# Patient Record
Sex: FEMALE | NOT HISPANIC OR LATINO | URBAN - METROPOLITAN AREA
[De-identification: names, ages, dates, MRNs, and addresses within clinical notes are randomized per-mention and may not be internally consistent; named-entity substitution may affect disease eponyms.]

---

## 2018-12-21 ENCOUNTER — EMERGENCY (EMERGENCY)
Facility: HOSPITAL | Age: 63
LOS: 1 days | Discharge: HOME | End: 2018-12-21
Attending: EMERGENCY MEDICINE
Payer: SELF-PAY

## 2018-12-21 VITALS
TEMPERATURE: 98 F | DIASTOLIC BLOOD PRESSURE: 93 MMHG | HEART RATE: 91 BPM | RESPIRATION RATE: 16 BRPM | OXYGEN SATURATION: 95 % | SYSTOLIC BLOOD PRESSURE: 155 MMHG

## 2018-12-21 PROBLEM — Z00.00 ENCOUNTER FOR PREVENTIVE HEALTH EXAMINATION: Status: ACTIVE | Noted: 2018-12-21

## 2018-12-21 PROCEDURE — 71046 X-RAY EXAM CHEST 2 VIEWS: CPT

## 2018-12-21 PROCEDURE — 90471 IMMUNIZATION ADMIN: CPT

## 2018-12-21 PROCEDURE — 71100 X-RAY EXAM RIBS UNI 2 VIEWS: CPT

## 2018-12-21 PROCEDURE — 99283 EMERGENCY DEPT VISIT LOW MDM: CPT | Mod: 25

## 2018-12-21 PROCEDURE — 71046 X-RAY EXAM CHEST 2 VIEWS: CPT | Mod: 26

## 2018-12-21 PROCEDURE — 99284 EMERGENCY DEPT VISIT MOD MDM: CPT

## 2018-12-21 PROCEDURE — 71100 X-RAY EXAM RIBS UNI 2 VIEWS: CPT | Mod: 26

## 2018-12-21 PROCEDURE — 90715 TDAP VACCINE 7 YRS/> IM: CPT

## 2018-12-21 RX ORDER — ACETAMINOPHEN 500 MG
325 TABLET ORAL ONCE
Qty: 0 | Refills: 0 | Status: COMPLETED | OUTPATIENT
Start: 2018-12-21 | End: 2018-12-21

## 2018-12-21 RX ORDER — ACETAMINOPHEN 500 MG
975 TABLET ORAL ONCE
Qty: 0 | Refills: 0 | Status: DISCONTINUED | OUTPATIENT
Start: 2018-12-21 | End: 2018-12-21

## 2018-12-21 RX ORDER — TETANUS TOXOID, REDUCED DIPHTHERIA TOXOID AND ACELLULAR PERTUSSIS VACCINE, ADSORBED 5; 2.5; 8; 8; 2.5 [IU]/.5ML; [IU]/.5ML; UG/.5ML; UG/.5ML; UG/.5ML
0.5 SUSPENSION INTRAMUSCULAR ONCE
Qty: 0 | Refills: 0 | Status: COMPLETED | OUTPATIENT
Start: 2018-12-21 | End: 2018-12-21

## 2018-12-21 RX ORDER — LIDOCAINE 4 G/100G
1 CREAM TOPICAL ONCE
Qty: 0 | Refills: 0 | Status: COMPLETED | OUTPATIENT
Start: 2018-12-21 | End: 2018-12-21

## 2018-12-21 RX ADMIN — LIDOCAINE 1 PATCH: 4 CREAM TOPICAL at 15:28

## 2018-12-21 RX ADMIN — Medication 325 MILLIGRAM(S): at 15:29

## 2018-12-21 RX ADMIN — TETANUS TOXOID, REDUCED DIPHTHERIA TOXOID AND ACELLULAR PERTUSSIS VACCINE, ADSORBED 0.5 MILLILITER(S): 5; 2.5; 8; 8; 2.5 SUSPENSION INTRAMUSCULAR at 15:29

## 2018-12-21 NOTE — ED ADULT NURSE NOTE - NSIMPLEMENTINTERV_GEN_ALL_ED
Implemented All Fall Risk Interventions:  [place] to call system. Call bell, personal items and telephone within reach. Instruct patient to call for assistance. Room bathroom lighting operational. Non-slip footwear when patient is off stretcher. Physically safe environment: no spills, clutter or unnecessary equipment. Stretcher in lowest position, wheels locked, appropriate side rails in place. Provide visual cue, wrist band, yellow gown, etc. Monitor gait and stability. Monitor for mental status changes and reorient to person, place, and time. Review medications for side effects contributing to fall risk. Reinforce activity limits and safety measures with patient and family.

## 2018-12-21 NOTE — ED ADULT NURSE NOTE - OBJECTIVE STATEMENT
63 year old female presents to ED via EMS complaining of slip and fall on water in basement at home falling to right side. complaining of pain to left back and arm. unsure if she hit her head however denies LOC. was able to stand up to ambulate to call for help. Patient is awake, alert, a&ox3, following commands, strong equal strength bilaterally x 4, sensory in tact, ambulating independently with steady gait noted.

## 2018-12-21 NOTE — ED PROVIDER NOTE - MUSCULOSKELETAL MINIMAL EXAM
mild left paraspinal mid thoracic to lumbar tenderness, no gross deformity, no swelling, no bruising, no stepoffs

## 2018-12-21 NOTE — ED PROVIDER NOTE - CROS ED GI ALL NEG
Your patient was seen today for a HRA visit. Abnormalities have been identified during this visit that may require additional testing and  follow up. I have included a copy of my visit note, please review the note and feel free to contact me with any questions.  Thank you for allowing me to participate in the care of your patients.  Beronica Walters NP  negative...

## 2018-12-21 NOTE — ED PROVIDER NOTE - MEDICAL DECISION MAKING DETAILS
63F presenting s/p mechanical fall. Mild msk tenderness and small abrasion. No focal deficits. Given mechanism and exam, will obs with no CT at this time.

## 2018-12-21 NOTE — ED PROVIDER NOTE - NSFOLLOWUPINSTRUCTIONS_ED_ALL_ED_FT
Take tylenol 600mg every 6 hours as needed for pain  Return to ED for any worsening or severe symptoms

## 2018-12-21 NOTE — ED PROVIDER NOTE - OBJECTIVE STATEMENT
63F with no pmh presenting with slip and fall at home. States she slipped on slippery wet floor and landed on her left side and might have hit her head. No LOC. Felt like the wind was knocked out of her initially. 63F with no pmh presenting with slip and fall at home with chief complaint of left sided back pain and mild headache. States she slipped on slippery wet floor and landed on her left side and might have hit her head. No LOC. Felt like the wind was knocked out of her initially. Was able to stand and ambulate after a few minutes. No fever, chills, changes in vision, cp, sob, vomiting, dysuria. Tetanus not up to date.

## 2018-12-21 NOTE — ED PROVIDER NOTE - PROGRESS NOTE DETAILS
Patient with improved symptoms. XR wet read negative. Patient with improved symptoms. XR wet read negative. Repeat neuro exam unchanged, benign

## 2019-06-03 NOTE — ED PROVIDER NOTE - TOBACCO USE
Problem: Communication  Goal: The ability to communicate needs accurately and effectively will improve  Outcome: PROGRESSING AS EXPECTED      Problem: Safety  Goal: Will remain free from falls  Outcome: PROGRESSING AS EXPECTED      Problem: Pain Management  Goal: Pain level will decrease to patient's comfort goal  Outcome: PROGRESSING AS EXPECTED      Problem: Psychosocial Needs:  Goal: Level of anxiety will decrease  Outcome: PROGRESSING AS EXPECTED         Unknown if ever smoked

## 2020-03-29 ENCOUNTER — INPATIENT (INPATIENT)
Facility: HOSPITAL | Age: 65
LOS: 5 days | Discharge: HOME | DRG: 177 | End: 2020-04-04
Attending: INTERNAL MEDICINE | Admitting: HOSPITALIST
Payer: COMMERCIAL

## 2020-03-29 VITALS
OXYGEN SATURATION: 94 % | TEMPERATURE: 101 F | SYSTOLIC BLOOD PRESSURE: 109 MMHG | HEART RATE: 94 BPM | DIASTOLIC BLOOD PRESSURE: 74 MMHG | RESPIRATION RATE: 16 BRPM

## 2020-03-29 DIAGNOSIS — R09.02 HYPOXEMIA: ICD-10-CM

## 2020-03-29 DIAGNOSIS — B34.2 CORONAVIRUS INFECTION, UNSPECIFIED: ICD-10-CM

## 2020-03-29 DIAGNOSIS — Z02.9 ENCOUNTER FOR ADMINISTRATIVE EXAMINATIONS, UNSPECIFIED: ICD-10-CM

## 2020-03-29 DIAGNOSIS — D72.825 BANDEMIA: ICD-10-CM

## 2020-03-29 DIAGNOSIS — B34.9 VIRAL INFECTION, UNSPECIFIED: ICD-10-CM

## 2020-03-29 DIAGNOSIS — Z29.9 ENCOUNTER FOR PROPHYLACTIC MEASURES, UNSPECIFIED: ICD-10-CM

## 2020-03-29 LAB
ALBUMIN SERPL ELPH-MCNC: 4 G/DL — SIGNIFICANT CHANGE UP (ref 3.3–5)
ALP SERPL-CCNC: 90 U/L — SIGNIFICANT CHANGE UP (ref 40–120)
ALT FLD-CCNC: 35 U/L — SIGNIFICANT CHANGE UP (ref 10–45)
ANION GAP SERPL CALC-SCNC: 16 MMOL/L — SIGNIFICANT CHANGE UP (ref 5–17)
APTT BLD: 32.9 SEC — SIGNIFICANT CHANGE UP (ref 27.5–36.3)
AST SERPL-CCNC: 25 U/L — SIGNIFICANT CHANGE UP (ref 10–40)
BASOPHILS # BLD AUTO: 0 K/UL — SIGNIFICANT CHANGE UP (ref 0–0.2)
BASOPHILS NFR BLD AUTO: 0 % — SIGNIFICANT CHANGE UP (ref 0–2)
BILIRUB SERPL-MCNC: 0.7 MG/DL — SIGNIFICANT CHANGE UP (ref 0.2–1.2)
BUN SERPL-MCNC: 7 MG/DL — SIGNIFICANT CHANGE UP (ref 7–23)
CALCIUM SERPL-MCNC: 9.2 MG/DL — SIGNIFICANT CHANGE UP (ref 8.4–10.5)
CHLORIDE SERPL-SCNC: 96 MMOL/L — SIGNIFICANT CHANGE UP (ref 96–108)
CO2 SERPL-SCNC: 25 MMOL/L — SIGNIFICANT CHANGE UP (ref 22–31)
CREAT SERPL-MCNC: 0.5 MG/DL — SIGNIFICANT CHANGE UP (ref 0.5–1.3)
EOSINOPHIL # BLD AUTO: 0 K/UL — SIGNIFICANT CHANGE UP (ref 0–0.5)
EOSINOPHIL NFR BLD AUTO: 0 % — SIGNIFICANT CHANGE UP (ref 0–6)
GIANT PLATELETS BLD QL SMEAR: PRESENT — SIGNIFICANT CHANGE UP
GLUCOSE SERPL-MCNC: 110 MG/DL — HIGH (ref 70–99)
HCT VFR BLD CALC: 39.5 % — SIGNIFICANT CHANGE UP (ref 34.5–45)
HGB BLD-MCNC: 13.3 G/DL — SIGNIFICANT CHANGE UP (ref 11.5–15.5)
INR BLD: 1.2 RATIO — HIGH (ref 0.88–1.16)
LYMPHOCYTES # BLD AUTO: 1.68 K/UL — SIGNIFICANT CHANGE UP (ref 1–3.3)
LYMPHOCYTES # BLD AUTO: 16.5 % — SIGNIFICANT CHANGE UP (ref 13–44)
MANUAL SMEAR VERIFICATION: SIGNIFICANT CHANGE UP
MCHC RBC-ENTMCNC: 30.2 PG — SIGNIFICANT CHANGE UP (ref 27–34)
MCHC RBC-ENTMCNC: 33.7 GM/DL — SIGNIFICANT CHANGE UP (ref 32–36)
MCV RBC AUTO: 89.6 FL — SIGNIFICANT CHANGE UP (ref 80–100)
MONOCYTES # BLD AUTO: 0.09 K/UL — SIGNIFICANT CHANGE UP (ref 0–0.9)
MONOCYTES NFR BLD AUTO: 0.9 % — LOW (ref 2–14)
NEUTROPHILS # BLD AUTO: 8.42 K/UL — HIGH (ref 1.8–7.4)
NEUTROPHILS NFR BLD AUTO: 69.6 % — SIGNIFICANT CHANGE UP (ref 43–77)
NEUTS BAND # BLD: 13 % — HIGH (ref 0–8)
PLAT MORPH BLD: NORMAL — SIGNIFICANT CHANGE UP
PLATELET # BLD AUTO: 222 K/UL — SIGNIFICANT CHANGE UP (ref 150–400)
POTASSIUM SERPL-MCNC: 3.5 MMOL/L — SIGNIFICANT CHANGE UP (ref 3.5–5.3)
POTASSIUM SERPL-SCNC: 3.5 MMOL/L — SIGNIFICANT CHANGE UP (ref 3.5–5.3)
PROCALCITONIN SERPL-MCNC: 0.14 NG/ML — HIGH (ref 0.02–0.1)
PROT SERPL-MCNC: 7.4 G/DL — SIGNIFICANT CHANGE UP (ref 6–8.3)
PROTHROM AB SERPL-ACNC: 13.7 SEC — HIGH (ref 10–12.9)
RBC # BLD: 4.41 M/UL — SIGNIFICANT CHANGE UP (ref 3.8–5.2)
RBC # FLD: 11.6 % — SIGNIFICANT CHANGE UP (ref 10.3–14.5)
RBC BLD AUTO: NORMAL — SIGNIFICANT CHANGE UP
SODIUM SERPL-SCNC: 137 MMOL/L — SIGNIFICANT CHANGE UP (ref 135–145)
TROPONIN T, HIGH SENSITIVITY RESULT: <6 NG/L — SIGNIFICANT CHANGE UP (ref 0–51)
WBC # BLD: 10.19 K/UL — SIGNIFICANT CHANGE UP (ref 3.8–10.5)
WBC # FLD AUTO: 10.19 K/UL — SIGNIFICANT CHANGE UP (ref 3.8–10.5)

## 2020-03-29 PROCEDURE — 71045 X-RAY EXAM CHEST 1 VIEW: CPT | Mod: 26

## 2020-03-29 PROCEDURE — 93010 ELECTROCARDIOGRAM REPORT: CPT

## 2020-03-29 PROCEDURE — 99285 EMERGENCY DEPT VISIT HI MDM: CPT

## 2020-03-29 PROCEDURE — 99233 SBSQ HOSP IP/OBS HIGH 50: CPT

## 2020-03-29 RX ORDER — ENOXAPARIN SODIUM 100 MG/ML
40 INJECTION SUBCUTANEOUS DAILY
Refills: 0 | Status: DISCONTINUED | OUTPATIENT
Start: 2020-03-29 | End: 2020-04-04

## 2020-03-29 RX ORDER — ALBUTEROL 90 UG/1
2 AEROSOL, METERED ORAL EVERY 6 HOURS
Refills: 0 | Status: DISCONTINUED | OUTPATIENT
Start: 2020-03-29 | End: 2020-04-04

## 2020-03-29 RX ORDER — ACETAMINOPHEN 500 MG
650 TABLET ORAL EVERY 4 HOURS
Refills: 0 | Status: DISCONTINUED | OUTPATIENT
Start: 2020-03-29 | End: 2020-04-04

## 2020-03-29 RX ORDER — ACETAMINOPHEN 500 MG
650 TABLET ORAL ONCE
Refills: 0 | Status: COMPLETED | OUTPATIENT
Start: 2020-03-29 | End: 2020-03-29

## 2020-03-29 RX ADMIN — Medication 650 MILLIGRAM(S): at 13:07

## 2020-03-29 NOTE — ED PROVIDER NOTE - CLINICAL SUMMARY MEDICAL DECISION MAKING FREE TEXT BOX
64y F sent in from urgent care for hypoxia in setting of fever, cough, chest pain, shortness of breath, diarrhea. on exam patient is well appearing, vitals wnl, lungs with bibasilar crackles, rest of exam unremarkable. Concern for viral illness, possible COVID, pneumonia, unlikely cardiac however will obtain cxr, ekg, labs, trop, likely dc home with covid precautions.

## 2020-03-29 NOTE — ED PROVIDER NOTE - OBJECTIVE STATEMENT
AV: 64y F otherwise healthy sent in by urgent care for fever x 12 days, cough x 3 days, chest pain and shortness of breath. Patient complains of fever x 12 days responsive to tylenol and ibuprofen, intermittent chest pain with coughing, and worsening shortness of breath. Was seen at urgent care, found to have sat 93%, sent to emergency department for further evaluation. Patient has been taking tylenol, ibuprofen, and albuterol inhaler. Endorses few episodes diarrhea.

## 2020-03-29 NOTE — H&P ADULT - ASSESSMENT
Pt is a 63 yo F w/ no medical history, presenting for 12 days of worsening SOB, fevers, and coughs. CXR concerning for multifocal PNA, ?exposure to possible COVID, COVID PCR pending

## 2020-03-29 NOTE — ED PROVIDER NOTE - MUSCULOSKELETAL, MLM
Telephone Encounter by Yumiko Mc RN at 11/14/17 11:57 AM     Author:  Yumiko Mc RN Service:  (none) Author Type:  Registered Nurse     Filed:  11/14/17 11:57 AM Encounter Date:  11/13/2017 Status:  Signed     :  Yumiko Mc RN (Registered Nurse)       From: Uri Dietrich  To: Tong Cole DO  Sent: 11/13/2017  4:28 PM CST  Subject: Upcoming Appointment    Lets use the 11/17 at 1pm at [facility] if still open       Revision History        Date/Time User Provider Type Action    > 11/14/17 11:57 AM Yumiko Mc RN Registered Nurse Sign    Attribution information within the note text is not available.            
Spine appears normal.

## 2020-03-29 NOTE — ED PROVIDER NOTE - NSFOLLOWUPINSTRUCTIONS_ED_ALL_ED_FT
You were evaluated in the Emergency Department for fever and cough.  You were examined by a physician and had labs, a chest xray and an ekg.  You likely have a viral respiratory infection, and which coul be caused by the novel coronavirus (COVID-19).    At this time, you do not meet our hospital's criteria for coronavirus testing and we are unable to test you specifically for the COVID-19 virus that is causing many infections around the world; however, we still recommend that you stay home and self-quarantine (avoid contact with other people) until 3 days after your symptoms have resolved.      See the CDC website for more information on this virus and what to do when sick: https://www.cdc.gov/coronavirus/2019-ncov/if-you-are-sick/steps-when-sick.html    We recommend that you:  1. Continue your home medications as prescribed.  2. Take Tylenol, 2 extra strength tablets, up to every 6 hours as needed for pain or any fever.  3. Drink plenty of fluids.  4. Call your primary care doctor tomorrow for follow-up.  5. Avoid contact with people and self-quarantine at home.    *** Return immediately (or call 911) if you have increased difficulty breathing, vomiting, chest pain, or develop other new/concerning symptoms. ***    YOU WERE SEEN IN THE ED FOR A LIKELY VIRAL ILLNESS. WE CANNOT PERFORM DEFINITIVE TESTING AT THIS TIME FOR THE NOVEL CORONAVIRUS.    YOU SHOULD SELF-QUARANTINE FOR 14 DAYS TO AVOID POTENTIAL SPREAD OF THIS VIRUS.    For outpatient coronavirus testing centers you may contact the following resources:  -[place] Novel Coronavirus 24/7 Hotline: (375)-[phone] (can contact for some [facility] testing centers, including the [facility] testing center).  -[facility]. To locate you local center: [facility]  -[facility]: [phone]    What is a coronavirus?  Coronaviruses are a large family of viruses that cause illnesses ranging from the common cold to more severe diseases such as Middle East Respiratory Syndrome (MERS) and Severe Acute Respiratory Syndrome (SARS).    What is Novel Coronavirus (COVID-19)?  The Centers for Disease Control and Prevention (CDC) is closely monitoring the outbreak caused by COVID-19. For the latest information about COVID-19, visit the CDC website at CDC.gov/Coronavirus    How are coronaviruses spread?  Coronaviruses can be transmitted from person to person, usually after close contact with an infected  person (for example, in a household, workplace, or healthcare setting), via droplets that become airborne after a cough or sneeze. These droplets can then infect a nearby person. Transmission can also occur by touching recently contaminated surfaces.    Is there a treatment for a COVID-19?  There is no specific treatment for disease caused by COVID-19. However, many of the symptoms can be treated based on the patient’s clinical condition. Supportive care for infected persons can be highly effective.    What are the symptoms of coronavirus infection?  It depends on the virus, but common signs include fever and/or respiratory symptoms such as cough and shortness of breath. In more severe cases, infection can cause pneumonia, severe acute respiratory syndrome, kidney failure and even death. Fortunately, most cases of COVID-19 have an illness no different than the influenza (flu), with a majority of these patients having mild symptoms and overall mortality which appears to be not much different than the flu.    What can I do to protect myself?  The best precautionary measures:  – washing your hands  – covering your cough  – disinfecting surfaces  – it is also advisable to avoid close contact with anyone showing symptoms of respiratory illness such as coughing and sneezing  – those with symptoms should wear a surgical mask when around others    What can I do to protect those around me?  If you have been identified as someone who may be infected with COVID-19, we recommend you follow the self-isolation procedures outlined on the following page to protect those around you and to limit the spread of this virus.    We recommend the below precautionary steps from now until 14 days from when you returned from your travel or date of your last known possible contact:    — Do not go to work, school or public areas. Avoid using public transportation, ridesharing or taxis.  — As much as possible, separate yourself from other people in your home. If you can, you should stay in a room and away from other people. Also, you should use a separate bathroom if available.  — Wear the supplied mask whenever you are around other people.  — If you have a non-urgent medical appointment, please reschedule for a later date. If the appointment is urgent, please call the health care provider and tell them that you are on self-isolation for possible COVID-19. This will help the health care provider’s office take steps to keep other people from getting infected or exposed. If you can reschedule routine appointments, do so.  — Wash your hands often with soap and water for at least 15 to 20 seconds or clean your hands with an alcohol-based hand  that contains 60 to 95% alcohol, covering all surfaces of your hands and rubbing them together until they feel dry. Soap and water should be used preferentially if hands are visibly dirty.  — Cover your mouth and nose with a tissue when you cough or sneeze. Throw used tissues in a lined trash can. Immediately wash your hands.  — Avoid touching your eyes, nose, and mouth with your hands.  — Avoid sharing personal household items. You should not share dishes, drinking glasses, cups, eating utensils, towels, or bedding with other people or pets in your home. After using these items, they should be washed thoroughly with soap and water.  — Clean and disinfect all “high-touch” surfaces every day. High touch surfaces include counters, tabletops, doorknobs, light switches, remote controls, bathroom fixtures, toilets, phones, keyboards, tablets, and bedside tables. Also, clean any surfaces that may have blood, stool, or body fluids on them.

## 2020-03-29 NOTE — ED PROVIDER NOTE - CARE PLAN
Principal Discharge DX:	Viral illness Principal Discharge DX:	Viral illness  Secondary Diagnosis:	Suspected 2019 novel coronavirus infection  Secondary Diagnosis:	Hypoxia

## 2020-03-29 NOTE — H&P ADULT - NSHPREVIEWOFSYSTEMS_GEN_ALL_CORE
REVIEW OF SYSTEMS:    CONSTITUTIONAL: fevers, dizziness  EYES/ENT: No visual changes;  No vertigo or throat pain   NECK: No pain or stiffness  RESPIRATORY: +cough, wheezing, hemoptysis; SOB  CARDIOVASCULAR: No chest pain or palpitations  GASTROINTESTINAL: No abdominal or epigastric pain. +nausea, vomiting, no hematemesis; resolved diarrhea , no constipation. No melena or hematochezia.  GENITOURINARY: No dysuria, frequency or hematuria  NEUROLOGICAL: No numbness or weakness  SKIN: No itching, rashes

## 2020-03-29 NOTE — H&P ADULT - HISTORY OF PRESENT ILLNESS
Pt is a 65 yo F w/ no medical history, presenting for 12 days of worsening SOB, fevers, and coughs. Spoke to patient's daughter in Greenlandic over the phone, due to pt not picking up her cellphone. As per pt's daughter, pt symptoms started 12 days ago, first showing as fevers, then later during this week developed coughs, and dizziness. Pt tried taking mucinex and tylenol for two days for her symptoms but stopped taking these medications after she developed some nausea and vomiting. Pt also experienced intermittent episodes of diarrhea, which has resolved. Pt's daughter reports pt works at a [employer] on [facility], where she believes she was exposed to a patient having cold-like symptoms 2 weeks prior to her onset of symptoms, unclear if that pt is confirmed COVID infection. This morning pt started to have increased WOB and presented to a local urgent care where she was found to have O2 sat of 93% and was recommended to go to a larger hospital center for further testing. Pt otherwise denies chills, chest p/p, sob, skin changes, no recent travelling.     At the ED pt vitals sig for Temp 100.5 /74, HR 94, O2 sat 94% on RA, RR 16. CBC sig for 13% bands, no lymphopenia, CMP wnl, CMP wnl, CXR sig for Diffuse bilateral patchy opacities throughout the lung fields, COVID PCR pending Pt is a 65 yo F w/ no medical history, presenting for 12 days of worsening SOB, fevers, and coughs. Spoke to patient's daughter in French over the phone, due to pt not picking up her cellphone. As per pt's daughter, pt symptoms started 12 days ago, first showing as fevers, then later during this week developed coughs, and dizziness. Pt tried taking mucinex and tylenol for two days for her symptoms but stopped taking these medications after she developed some nausea and vomiting. Pt also experienced intermittent episodes of diarrhea, which has resolved. Pt's daughter reports pt works at a [employer] on [facility], where she believes she was exposed to a patient having cold-like symptoms 2 weeks prior to her onset of symptoms, unclear if that pt is confirmed COVID infection. This morning pt started to have increased WOB and presented to a local urgent care where she was found to have O2 sat of 93% and was recommended to go to a larger hospital center for further testing. Pt otherwise denies chills, chest p/p, sob, skin changes, no recent travelling.     At the ED pt vitals sig for Temp 100.5 /74, HR 94, O2 sat 94% on RA, RR 16. CBC sig for 13% bands, no lymphopenia, CMP wnl, CXR sig for Diffuse bilateral patchy opacities throughout the lung fields, COVID PCR pending

## 2020-03-29 NOTE — ED ADULT NURSE REASSESSMENT NOTE - NS ED NURSE REASSESS COMMENT FT1
Pt. was planned to be disharged, when doing VS pt O2 dropped to 89% on room air, placed pt on 2L and O2 improved (see flowsheet). MD currently in room, now plans to admit pt. NAD.

## 2020-03-29 NOTE — ED ADULT TRIAGE NOTE - ARRIVAL FROM
Problem: Ineffective Coping  Goal: Participates in unit activities  Interventions:  - Provide therapeutic environment   - Provide required programming   - Redirect inappropriate behaviors    Outcome: Progressing Doctor's office

## 2020-03-29 NOTE — H&P ADULT - NSHPSOCIALHISTORY_GEN_ALL_CORE
Lives at home with sibling and , denies smoking, drinking recreational drug use, works at a [employer]. Family has been self-isolating themselves.

## 2020-03-29 NOTE — ED ADULT NURSE NOTE - OBJECTIVE STATEMENT
Pt is a 65 y/o female c/o sob, dry cough, cp worsening when laying flat, fever improved w tylenol/ibuprofen x12 days. Went to PCP today who told her to come to ED to rule out pneumonia/covid. Denies PMH/PSH. States she had a few episodes of diarrhea in past 12 days. Denies N/V, numbness, tingling, dizziness, weakness, headache. A&Ox3. Breathing unlabored and spontaneous. Abdomen soft, nontender, nondistended. Full ROM and strength of extremities. Skin dry and intact. Safety and comfort measures provided.

## 2020-03-29 NOTE — ED ADULT NURSE NOTE - NSIMPLEMENTINTERV_GEN_ALL_ED
Patent Implemented All Universal Safety Interventions:  [place] to call system. Call bell, personal items and telephone within reach. Instruct patient to call for assistance. Room bathroom lighting operational. Non-slip footwear when patient is off stretcher. Physically safe environment: no spills, clutter or unnecessary equipment. Stretcher in lowest position, wheels locked, appropriate side rails in place.

## 2020-03-29 NOTE — H&P ADULT - PROBLEM SELECTOR PLAN 5
Problem: Discharge planning issues. Plan; Transitions of Care Status:  1. Name of PCP: Dr. Thomas  2. PCP contacted on Admission: []Y [X]N  3. PCP contacted at Discharge: []Y []N  4. Post-Discharge Appointment Date and Location:   5. Summary of Handoff given to PCP:

## 2020-03-29 NOTE — ED PROVIDER NOTE - PROGRESS NOTE DETAILS
AV: labs reviewed. CXR concerning for COVID. Patient satting well on RA. Will dc home with instructions to isolate. AV: on reassessment prior to discharge, patient now satting 88-89%on RA, improved with NC 2L. Also received call from lab, 15% bands. Will admit to hospital for oxygen, COVID swab sent.

## 2020-03-29 NOTE — H&P ADULT - NSHPLABSRESULTS_GEN_ALL_CORE
LABS: Personally reviewed labs, imaging, and ECG                          13.3   10.19 )-----------( 222      ( 29 Mar 2020 12:53 )             39.5       03-29    137  |  96  |  7   ----------------------------<  110<H>  3.5   |  25  |  0.50    Ca    9.2      29 Mar 2020 12:53    TPro  7.4  /  Alb  4.0  /  TBili  0.7  /  DBili  x   /  AST  25  /  ALT  35  /  AlkPhos  90  03-29       LIVER FUNCTIONS - ( 29 Mar 2020 12:53 )  Alb: 4.0 g/dL / Pro: 7.4 g/dL / ALK PHOS: 90 U/L / ALT: 35 U/L / AST: 25 U/L / GGT: x           PT/INR - ( 29 Mar 2020 12:53 )   PT: 13.7 sec;   INR: 1.20 ratio         PTT - ( 29 Mar 2020 12:53 )  PTT:32.9 sec    Lactate Trend    CAPILLARY BLOOD GLUCOSE    RADIOLOGY & ADDITIONAL TESTS:    < from: Xray Chest 1 View- PORTABLE-Urgent (03.29.20 @ 13:18) >    IMPRESSION:    Diffuse bilateral patchy opacities throughout the lung fields, compatible with multifocal pneumonia.    < end of copied text >

## 2020-03-29 NOTE — H&P ADULT - ATTENDING COMMENTS
64F w/ no medical history, presenting with c/o worsening SOB, fevers, and cough x 12 days. CXR concerning for multifocal PNA, ?exposure to possible COVID. O2 sats 89% on room air. Check Procalcitonin, bld cx, urine Legionella. f/up COVID PCR result ; if positive start Plaquenil. Check EKG for QTc monitoring. O2 supplementation via NC ; monitor O2 saturation. Airborne/Contact Isolation precautions. Lovenox for DVT ppx.

## 2020-03-29 NOTE — H&P ADULT - PROBLEM/PLAN-4
ASSESSMENT COMPLETED AS CHARTED. VSS. PT OFF OF BEDREST AT 2000. NO DZZINESS
NOTED, STEADY GAIT. PT DENIES PAIN, WEAKNESS, N/V/D. A/V PACED ON MONITOR. PT
DENIES ANY FIRTHER NEEDS AT THIS TIME. HOURLY ROUNDING IN PLACE FOR PT SAFETY.
CLWR. DISPLAY PLAN FREE TEXT

## 2020-03-29 NOTE — ED PROVIDER NOTE - PATIENT PORTAL LINK FT
You can access the FollowMyHealth Patient Portal offered by [facility] by registering at the following website: [email or web address]. By joining [facility]’s FollowMyHealth portal, you will also be able to view your health information using other applications (apps) compatible with our system.

## 2020-03-29 NOTE — H&P ADULT - PROBLEM SELECTOR PLAN 1
Possible COVID19 exposure ~3-4 weeks prior, found to have declining O2 sat, CXR showing multifocal PNA  - f/u COVID19 test  - Possible COVID19 exposure ~3-4 weeks prior, found to have declining O2 sat, CXR showing multifocal PNA  - f/u COVID19 PCR  - will add hydroxycholoroquine 400mg BID firs day, 200mg BID next 4days  - f/u QTc

## 2020-03-29 NOTE — H&P ADULT - PROBLEM SELECTOR PLAN 2
Found to have elevated bands on initial CBC, w/o leukocytosis  - In the setting of fevers, will get Bcx  - Found to have elevated bands on initial CBC, w/o leukocytosis  - In the setting of fevers, will get Bcx x2  - If COVID neg, will start  abx for CAP  - AVOID peneciliins, pt develops SOB symptoms Found to have elevated bands on initial CBC, w/o leukocytosis  - In the setting of fevers, will get Bcx x2  - If COVID neg or procal elevated, will start  abx for CAP  - AVOID penicillins, pt develops SOB symptoms Found to have elevated bands on initial CBC, w/o leukocytosis  - In the setting of fevers, will get Bcx x2  - If COVID neg or procal elevated, will start  abx for CAP  - f/u urine legionella  - AVOID penicillins, pt develops SOB symptoms

## 2020-03-29 NOTE — H&P ADULT - PROBLEM SELECTOR PLAN 3
Found to have O2 sat to 93% on RA outpatient, 94% RA at the ED  - titrate up O2 requirements as per COVID protocol  - if on nonrebreather, will consider adding salumedrol at 1mg/kg BID dosing, for 3-5 days as per [facility] pulm/icu guidelines Found to have O2 sat to 93% on RA outpatient, 94% RA at the ED, now on 2L NC  - titrate up O2 requirements as per COVID protocol  - if on nonrebreather, will consider adding salumedrol at 1mg/kg BID dosing, for 3-5 days as per [facility] pulm/icu guidelines

## 2020-03-29 NOTE — H&P ADULT - NSHPPHYSICALEXAM_GEN_ALL_CORE
Vital Signs (24 Hrs):  T(C): 37.2 (03-29-20 @ 16:15), Max: 38.1 (03-29-20 @ 10:29)  HR: 80 (03-29-20 @ 16:15) (80 - 94)  BP: 106/78 (03-29-20 @ 16:15) (106/78 - 130/82)  RR: 18 (03-29-20 @ 16:24) (16 - 18)  SpO2: 95% (03-29-20 @ 16:24) (89% - 95%)  Wt(kg): --  PHYSICAL EXAM: Taken from ED physical exam to preserve PPE and exposure to further COVID19  GENERAL: NAD, lying in bed comfortably  HEAD:  Atraumatic, Normocephalic  EYES: EOMI, PERRLA, conjunctiva and sclera clear  ENT: Moist mucous membranes  NECK: Supple, No JVD  CHEST/LUNG: Rales in left lower lobe  HEART: Regular rate and rhythm; No murmurs, rubs, or gallops  ABDOMEN: Bowel sounds present; Soft, Nontender, Nondistended   EXTREMITIES:  2+ Peripheral Pulses, brisk capillary refill. No clubbing, cyanosis, or edema  NERVOUS SYSTEM:  Alert & Oriented X3, speech clear. No deficits   MSK: FROM all 4 extremities, full and equal strength  SKIN: No rashes or lesions

## 2020-03-30 LAB
ALBUMIN SERPL ELPH-MCNC: 3.6 G/DL — SIGNIFICANT CHANGE UP (ref 3.3–5)
ALP SERPL-CCNC: 89 U/L — SIGNIFICANT CHANGE UP (ref 40–120)
ALT FLD-CCNC: 29 U/L — SIGNIFICANT CHANGE UP (ref 10–45)
ANION GAP SERPL CALC-SCNC: 16 MMOL/L — SIGNIFICANT CHANGE UP (ref 5–17)
AST SERPL-CCNC: 19 U/L — SIGNIFICANT CHANGE UP (ref 10–40)
BASOPHILS # BLD AUTO: 0.01 K/UL — SIGNIFICANT CHANGE UP (ref 0–0.2)
BASOPHILS NFR BLD AUTO: 0.1 % — SIGNIFICANT CHANGE UP (ref 0–2)
BILIRUB SERPL-MCNC: 0.6 MG/DL — SIGNIFICANT CHANGE UP (ref 0.2–1.2)
BUN SERPL-MCNC: 11 MG/DL — SIGNIFICANT CHANGE UP (ref 7–23)
CALCIUM SERPL-MCNC: 9 MG/DL — SIGNIFICANT CHANGE UP (ref 8.4–10.5)
CHLORIDE SERPL-SCNC: 92 MMOL/L — LOW (ref 96–108)
CO2 SERPL-SCNC: 24 MMOL/L — SIGNIFICANT CHANGE UP (ref 22–31)
CREAT SERPL-MCNC: 0.5 MG/DL — SIGNIFICANT CHANGE UP (ref 0.5–1.3)
EOSINOPHIL # BLD AUTO: 0.01 K/UL — SIGNIFICANT CHANGE UP (ref 0–0.5)
EOSINOPHIL NFR BLD AUTO: 0.1 % — SIGNIFICANT CHANGE UP (ref 0–6)
GLUCOSE SERPL-MCNC: 165 MG/DL — HIGH (ref 70–99)
HCT VFR BLD CALC: 38.3 % — SIGNIFICANT CHANGE UP (ref 34.5–45)
HCV AB S/CO SERPL IA: 0.1 S/CO — SIGNIFICANT CHANGE UP (ref 0–0.99)
HCV AB SERPL-IMP: SIGNIFICANT CHANGE UP
HGB BLD-MCNC: 13 G/DL — SIGNIFICANT CHANGE UP (ref 11.5–15.5)
IMM GRANULOCYTES NFR BLD AUTO: 0.5 % — SIGNIFICANT CHANGE UP (ref 0–1.5)
LYMPHOCYTES # BLD AUTO: 1.25 K/UL — SIGNIFICANT CHANGE UP (ref 1–3.3)
LYMPHOCYTES # BLD AUTO: 12.7 % — LOW (ref 13–44)
MCHC RBC-ENTMCNC: 31 PG — SIGNIFICANT CHANGE UP (ref 27–34)
MCHC RBC-ENTMCNC: 33.9 GM/DL — SIGNIFICANT CHANGE UP (ref 32–36)
MCV RBC AUTO: 91.4 FL — SIGNIFICANT CHANGE UP (ref 80–100)
MONOCYTES # BLD AUTO: 0.39 K/UL — SIGNIFICANT CHANGE UP (ref 0–0.9)
MONOCYTES NFR BLD AUTO: 4 % — SIGNIFICANT CHANGE UP (ref 2–14)
NEUTROPHILS # BLD AUTO: 8.14 K/UL — HIGH (ref 1.8–7.4)
NEUTROPHILS NFR BLD AUTO: 82.6 % — HIGH (ref 43–77)
NRBC # BLD: 0 /100 WBCS — SIGNIFICANT CHANGE UP (ref 0–0)
PLATELET # BLD AUTO: 244 K/UL — SIGNIFICANT CHANGE UP (ref 150–400)
POTASSIUM SERPL-MCNC: 3.1 MMOL/L — LOW (ref 3.5–5.3)
POTASSIUM SERPL-SCNC: 3.1 MMOL/L — LOW (ref 3.5–5.3)
PROT SERPL-MCNC: 7.4 G/DL — SIGNIFICANT CHANGE UP (ref 6–8.3)
RBC # BLD: 4.19 M/UL — SIGNIFICANT CHANGE UP (ref 3.8–5.2)
RBC # FLD: 11.8 % — SIGNIFICANT CHANGE UP (ref 10.3–14.5)
SARS-COV-2 RNA SPEC QL NAA+PROBE: DETECTED
SODIUM SERPL-SCNC: 132 MMOL/L — LOW (ref 135–145)
WBC # BLD: 9.85 K/UL — SIGNIFICANT CHANGE UP (ref 3.8–10.5)
WBC # FLD AUTO: 9.85 K/UL — SIGNIFICANT CHANGE UP (ref 3.8–10.5)

## 2020-03-30 PROCEDURE — 99232 SBSQ HOSP IP/OBS MODERATE 35: CPT

## 2020-03-30 PROCEDURE — 93010 ELECTROCARDIOGRAM REPORT: CPT

## 2020-03-30 RX ORDER — INFLUENZA VIRUS VACCINE 15; 15; 15; 15 UG/.5ML; UG/.5ML; UG/.5ML; UG/.5ML
0.5 SUSPENSION INTRAMUSCULAR ONCE
Refills: 0 | Status: DISCONTINUED | OUTPATIENT
Start: 2020-03-30 | End: 2020-04-04

## 2020-03-30 RX ORDER — POTASSIUM CHLORIDE 20 MEQ
40 PACKET (EA) ORAL ONCE
Refills: 0 | Status: COMPLETED | OUTPATIENT
Start: 2020-03-30 | End: 2020-03-30

## 2020-03-30 RX ADMIN — Medication 650 MILLIGRAM(S): at 05:26

## 2020-03-30 RX ADMIN — Medication 40 MILLIEQUIVALENT(S): at 17:58

## 2020-03-30 RX ADMIN — ENOXAPARIN SODIUM 40 MILLIGRAM(S): 100 INJECTION SUBCUTANEOUS at 12:37

## 2020-03-30 RX ADMIN — Medication 650 MILLIGRAM(S): at 04:29

## 2020-03-30 NOTE — PROGRESS NOTE ADULT - PROBLEM SELECTOR PLAN 1
Possible COVID19 exposure ~3-4 weeks prior, found to have declining O2 sat, CXR showing multifocal PNA  - COVID positive  - will add hydroxycholoroquine 400mg BID firs day, 200mg BID next 4days  - Qtc 437, continue to monitor Possible COVID19 exposure ~3-4 weeks prior, found to have declining O2 sat, CXR showing multifocal PNA  - COVID positive  - will hold off starting plaquenil, satting well on 2L currently - if respiratory status worsens or oxygen requirements increase, consider starting plaquenil  - Qtc 437, continue to monitor

## 2020-03-30 NOTE — PROGRESS NOTE ADULT - PROBLEM SELECTOR PLAN 2
Found to have elevated bands on initial CBC, w/o leukocytosis  - In the setting of fevers, will get Bcx x2  - f/u urine legionella  - AVOID penicillins, pt develops SOB symptoms

## 2020-03-30 NOTE — PROGRESS NOTE ADULT - ASSESSMENT
Pt is a 65 yo F w/ no medical history, presenting for 12 days of worsening SOB, fevers, and coughs. CXR concerning for multifocal PNA, ?exposure to possible COVID, COVID PCR pending

## 2020-03-30 NOTE — PROGRESS NOTE ADULT - PROBLEM SELECTOR PLAN 3
Found to have O2 sat to 93% on RA outpatient, 94% RA at the ED, now on 2L NC  - titrate up O2 requirements as per COVID protocol

## 2020-03-30 NOTE — PROGRESS NOTE ADULT - SUBJECTIVE AND OBJECTIVE BOX
[facility] Division of Hospital Medicine Progress Note    Patient is a 64y old  Female who presents with a chief complaint of SOB (29 Mar 2020 17:55)      SUBJECTIVE / OVERNIGHT EVENTS:  Patient seen and examined - spoke with patient post-exam via Frisian  [id number]. She is feeling slightly better, still having mild cough and SOB.  ADDITIONAL REVIEW OF SYSTEMS:  Denies current abdominal symptoms    MEDICATIONS  (STANDING):  enoxaparin Injectable 40 milliGRAM(s) SubCutaneous daily  influenza   Vaccine 0.5 milliLiter(s) IntraMuscular once  potassium chloride    Tablet ER 40 milliEquivalent(s) Oral once    MEDICATIONS  (PRN):  acetaminophen   Tablet .. 650 milliGRAM(s) Oral every 4 hours PRN Temp greater or equal to 38.5C (101.3F)  acetaminophen  Suppository .. 650 milliGRAM(s) Rectal every 4 hours PRN Temp greater or equal to 38.5C (101.3F)  ALBUTerol    90 MICROgram(s) HFA Inhaler 2 Puff(s) Inhalation every 6 hours PRN cough      CAPILLARY BLOOD GLUCOSE        I&O's Summary      PHYSICAL EXAM:  Vital Signs Last 24 Hrs  T(C): 36.9 (30 Mar 2020 12:35), Max: 38.3 (30 Mar 2020 04:26)  T(F): 98.4 (30 Mar 2020 12:35), Max: 100.9 (30 Mar 2020 04:26)  HR: 69 (30 Mar 2020 12:35) (69 - 89)  BP: 103/66 (30 Mar 2020 12:35) (103/66 - 148/81)  BP(mean): --  RR: 20 (30 Mar 2020 12:35) (18 - 20)  SpO2: 98% (30 Mar 2020 12:35) (89% - 98%)    CONSTITUTIONAL: NAD, well-developed, well-groomed  ENMT: Moist oral mucosa, no pharyngeal injection or exudates; normal dentition  RESPIRATORY: Normal respiratory effort; lungs are clear to auscultation bilaterally  CARDIOVASCULAR: Regular rate and rhythm, normal S1 and S2, no murmur/rub/gallop; No lower extremity edema; Peripheral pulses are 2+ bilaterally  ABDOMEN: Nontender to palpation, normoactive bowel sounds, no rebound/guarding; No hepatosplenomegaly  PSYCH: A+O to person, place, and time; affect appropriate  NEUROLOGY: CN 2-12 are intact and symmetric; no gross sensory deficits   SKIN: No rashes; no palpable lesions    LABS:                        13.0   9.85  )-----------( 244      ( 30 Mar 2020 11:45 )             38.3     03-30    132<L>  |  92<L>  |  11  ----------------------------<  165<H>  3.1<L>   |  24  |  0.50    Ca    9.0      30 Mar 2020 11:45    TPro  7.4  /  Alb  3.6  /  TBili  0.6  /  DBili  x   /  AST  19  /  ALT  29  /  AlkPhos  89  03-30    PT/INR - ( 29 Mar 2020 12:53 )   PT: 13.7 sec;   INR: 1.20 ratio         PTT - ( 29 Mar 2020 12:53 )  PTT:32.9 sec          COVID-19 PCR: Detected (03-29-20 @ 16:39)      RADIOLOGY & ADDITIONAL TESTS:  Imaging from Last 24 Hours:  Electrocardiogram/QTc Interval: Qtc 437    COORDINATION OF CARE:  Care Discussed with Consultants/Other Providers:

## 2020-03-31 LAB
ALBUMIN SERPL ELPH-MCNC: 3.6 G/DL — SIGNIFICANT CHANGE UP (ref 3.3–5)
ALP SERPL-CCNC: 85 U/L — SIGNIFICANT CHANGE UP (ref 40–120)
ALT FLD-CCNC: 26 U/L — SIGNIFICANT CHANGE UP (ref 10–45)
ANION GAP SERPL CALC-SCNC: 14 MMOL/L — SIGNIFICANT CHANGE UP (ref 5–17)
AST SERPL-CCNC: 20 U/L — SIGNIFICANT CHANGE UP (ref 10–40)
BASOPHILS # BLD AUTO: 0.02 K/UL — SIGNIFICANT CHANGE UP (ref 0–0.2)
BASOPHILS NFR BLD AUTO: 0.2 % — SIGNIFICANT CHANGE UP (ref 0–2)
BILIRUB SERPL-MCNC: 0.6 MG/DL — SIGNIFICANT CHANGE UP (ref 0.2–1.2)
BUN SERPL-MCNC: 11 MG/DL — SIGNIFICANT CHANGE UP (ref 7–23)
CALCIUM SERPL-MCNC: 9.2 MG/DL — SIGNIFICANT CHANGE UP (ref 8.4–10.5)
CHLORIDE SERPL-SCNC: 91 MMOL/L — LOW (ref 96–108)
CO2 SERPL-SCNC: 24 MMOL/L — SIGNIFICANT CHANGE UP (ref 22–31)
CREAT SERPL-MCNC: 0.47 MG/DL — LOW (ref 0.5–1.3)
CRP SERPL-MCNC: 5.75 MG/DL — HIGH (ref 0–0.4)
EOSINOPHIL # BLD AUTO: 0.01 K/UL — SIGNIFICANT CHANGE UP (ref 0–0.5)
EOSINOPHIL NFR BLD AUTO: 0.1 % — SIGNIFICANT CHANGE UP (ref 0–6)
FERRITIN SERPL-MCNC: 1026 NG/ML — HIGH (ref 15–150)
GLUCOSE SERPL-MCNC: 110 MG/DL — HIGH (ref 70–99)
HCT VFR BLD CALC: 36.2 % — SIGNIFICANT CHANGE UP (ref 34.5–45)
HGB BLD-MCNC: 12.6 G/DL — SIGNIFICANT CHANGE UP (ref 11.5–15.5)
IMM GRANULOCYTES NFR BLD AUTO: 0.8 % — SIGNIFICANT CHANGE UP (ref 0–1.5)
LEGIONELLA AG UR QL: NEGATIVE — SIGNIFICANT CHANGE UP
LYMPHOCYTES # BLD AUTO: 1.79 K/UL — SIGNIFICANT CHANGE UP (ref 1–3.3)
LYMPHOCYTES # BLD AUTO: 21.5 % — SIGNIFICANT CHANGE UP (ref 13–44)
MCHC RBC-ENTMCNC: 32.6 PG — SIGNIFICANT CHANGE UP (ref 27–34)
MCHC RBC-ENTMCNC: 34.8 GM/DL — SIGNIFICANT CHANGE UP (ref 32–36)
MCV RBC AUTO: 93.8 FL — SIGNIFICANT CHANGE UP (ref 80–100)
MONOCYTES # BLD AUTO: 0.48 K/UL — SIGNIFICANT CHANGE UP (ref 0–0.9)
MONOCYTES NFR BLD AUTO: 5.8 % — SIGNIFICANT CHANGE UP (ref 2–14)
NEUTROPHILS # BLD AUTO: 5.95 K/UL — SIGNIFICANT CHANGE UP (ref 1.8–7.4)
NEUTROPHILS NFR BLD AUTO: 71.6 % — SIGNIFICANT CHANGE UP (ref 43–77)
NRBC # BLD: 0 /100 WBCS — SIGNIFICANT CHANGE UP (ref 0–0)
PLATELET # BLD AUTO: 248 K/UL — SIGNIFICANT CHANGE UP (ref 150–400)
POTASSIUM SERPL-MCNC: 3.8 MMOL/L — SIGNIFICANT CHANGE UP (ref 3.5–5.3)
POTASSIUM SERPL-SCNC: 3.8 MMOL/L — SIGNIFICANT CHANGE UP (ref 3.5–5.3)
PROT SERPL-MCNC: 7.3 G/DL — SIGNIFICANT CHANGE UP (ref 6–8.3)
RBC # BLD: 3.86 M/UL — SIGNIFICANT CHANGE UP (ref 3.8–5.2)
RBC # FLD: 11.9 % — SIGNIFICANT CHANGE UP (ref 10.3–14.5)
SODIUM SERPL-SCNC: 129 MMOL/L — LOW (ref 135–145)
WBC # BLD: 8.32 K/UL — SIGNIFICANT CHANGE UP (ref 3.8–10.5)
WBC # FLD AUTO: 8.32 K/UL — SIGNIFICANT CHANGE UP (ref 3.8–10.5)

## 2020-03-31 PROCEDURE — 99232 SBSQ HOSP IP/OBS MODERATE 35: CPT

## 2020-03-31 RX ADMIN — ENOXAPARIN SODIUM 40 MILLIGRAM(S): 100 INJECTION SUBCUTANEOUS at 09:48

## 2020-03-31 NOTE — PROGRESS NOTE ADULT - SUBJECTIVE AND OBJECTIVE BOX
[facility] Division of Hospital Medicine Progress Note    Patient is a 64y old  Female who presents with a chief complaint of SOB (30 Mar 2020 14:51)      SUBJECTIVE / OVERNIGHT EVENTS:  Afebrile overnight. Spoke with patient through Armenian  [id number]. She continues to have pain with coughing and SOB but feels slightly better today.  ADDITIONAL REVIEW OF SYSTEMS:  Denies abdominal pain    MEDICATIONS  (STANDING):  enoxaparin Injectable 40 milliGRAM(s) SubCutaneous daily  influenza   Vaccine 0.5 milliLiter(s) IntraMuscular once    MEDICATIONS  (PRN):  acetaminophen   Tablet .. 650 milliGRAM(s) Oral every 4 hours PRN Temp greater or equal to 38.5C (101.3F)  acetaminophen  Suppository .. 650 milliGRAM(s) Rectal every 4 hours PRN Temp greater or equal to 38.5C (101.3F)  ALBUTerol    90 MICROgram(s) HFA Inhaler 2 Puff(s) Inhalation every 6 hours PRN cough      CAPILLARY BLOOD GLUCOSE        I&O's Summary    30 Mar 2020 07:01  -  31 Mar 2020 07:00  --------------------------------------------------------  IN: 200 mL / OUT: 0 mL / NET: 200 mL        PHYSICAL EXAM:  Vital Signs Last 24 Hrs  T(C): 37.6 (31 Mar 2020 13:14), Max: 37.6 (31 Mar 2020 04:54)  T(F): 99.6 (31 Mar 2020 13:14), Max: 99.7 (31 Mar 2020 04:54)  HR: 88 (31 Mar 2020 13:14) (81 - 88)  BP: 118/75 (31 Mar 2020 13:14) (99/64 - 118/75)  BP(mean): --  RR: 18 (31 Mar 2020 13:14) (18 - 20)  SpO2: 97% (31 Mar 2020 13:14) (95% - 98%)    CONSTITUTIONAL: NAD, well-developed, well-groomed  ENMT: Moist oral mucosa, no pharyngeal injection or exudates; normal dentition  RESPIRATORY: Normal respiratory effort; lungs are clear to auscultation bilaterally  CARDIOVASCULAR: Regular rate and rhythm, normal S1 and S2, no murmur/rub/gallop; No lower extremity edema; Peripheral pulses are 2+ bilaterally  ABDOMEN: Nontender to palpation, normoactive bowel sounds, no rebound/guarding; No hepatosplenomegaly  PSYCH: Affect appropriate  NEUROLOGY: CN 2-12 are intact and symmetric; no gross sensory deficits   SKIN: No rashes; no palpable lesions    LABS:                        12.6   8.32  )-----------( 248      ( 31 Mar 2020 07:02 )             36.2     03-31    129<L>  |  91<L>  |  11  ----------------------------<  110<H>  3.8   |  24  |  0.47<L>    Ca    9.2      31 Mar 2020 07:01    TPro  7.3  /  Alb  3.6  /  TBili  0.6  /  DBili  x   /  AST  20  /  ALT  26  /  AlkPhos  85  03-31              COVID-19 PCR: Detected (03-29-20 @ 16:39)      RADIOLOGY & ADDITIONAL TESTS:  Imaging from Last 24 Hours:  Electrocardiogram/QTc Interval:    COORDINATION OF CARE:  Care Discussed with Consultants/Other Providers:

## 2020-03-31 NOTE — PROGRESS NOTE ADULT - PROBLEM SELECTOR PLAN 1
Possible COVID19 exposure ~3-4 weeks prior, found to have declining O2 sat, CXR showing multifocal PNA  - COVID positive  - will hold off starting plaquenil, satting well on 2L currently - if respiratory status worsens or oxygen requirements increase, consider starting plaquenil  - Qtc 437 yesterday, continue to monitor

## 2020-03-31 NOTE — PROGRESS NOTE ADULT - ASSESSMENT
Pt is a 65 yo F w/ no medical history, presenting for 12 days of worsening SOB, fevers, and coughs. CXR concerning for multifocal PNA, ?exposure to possible COVID - now COVID positive

## 2020-03-31 NOTE — PROGRESS NOTE ADULT - PROBLEM SELECTOR PLAN 2
Found to have elevated bands on initial CBC, w/o leukocytosis  - In the setting of fevers, will get Bcx x2  - f/u urine legionella (collected)  - AVOID penicillins, pt develops SOB symptoms

## 2020-04-01 LAB
ALBUMIN SERPL ELPH-MCNC: 3.5 G/DL — SIGNIFICANT CHANGE UP (ref 3.3–5)
ALP SERPL-CCNC: 85 U/L — SIGNIFICANT CHANGE UP (ref 40–120)
ALT FLD-CCNC: 32 U/L — SIGNIFICANT CHANGE UP (ref 10–45)
ANION GAP SERPL CALC-SCNC: 13 MMOL/L — SIGNIFICANT CHANGE UP (ref 5–17)
AST SERPL-CCNC: 25 U/L — SIGNIFICANT CHANGE UP (ref 10–40)
BASOPHILS # BLD AUTO: 0.01 K/UL — SIGNIFICANT CHANGE UP (ref 0–0.2)
BASOPHILS NFR BLD AUTO: 0.1 % — SIGNIFICANT CHANGE UP (ref 0–2)
BILIRUB SERPL-MCNC: 0.6 MG/DL — SIGNIFICANT CHANGE UP (ref 0.2–1.2)
BUN SERPL-MCNC: 8 MG/DL — SIGNIFICANT CHANGE UP (ref 7–23)
CALCIUM SERPL-MCNC: 9.3 MG/DL — SIGNIFICANT CHANGE UP (ref 8.4–10.5)
CHLORIDE SERPL-SCNC: 95 MMOL/L — LOW (ref 96–108)
CO2 SERPL-SCNC: 27 MMOL/L — SIGNIFICANT CHANGE UP (ref 22–31)
CREAT SERPL-MCNC: 0.46 MG/DL — LOW (ref 0.5–1.3)
CRP SERPL-MCNC: 8.67 MG/DL — HIGH (ref 0–0.4)
D DIMER BLD IA.RAPID-MCNC: 283 NG/ML DDU — HIGH
EOSINOPHIL # BLD AUTO: 0.05 K/UL — SIGNIFICANT CHANGE UP (ref 0–0.5)
EOSINOPHIL NFR BLD AUTO: 0.5 % — SIGNIFICANT CHANGE UP (ref 0–6)
FERRITIN SERPL-MCNC: 1059 NG/ML — HIGH (ref 15–150)
GLUCOSE SERPL-MCNC: 125 MG/DL — HIGH (ref 70–99)
HCT VFR BLD CALC: 34.8 % — SIGNIFICANT CHANGE UP (ref 34.5–45)
HGB BLD-MCNC: 12.3 G/DL — SIGNIFICANT CHANGE UP (ref 11.5–15.5)
IMM GRANULOCYTES NFR BLD AUTO: 0.5 % — SIGNIFICANT CHANGE UP (ref 0–1.5)
LDH SERPL L TO P-CCNC: 365 U/L — HIGH (ref 50–242)
LYMPHOCYTES # BLD AUTO: 1.57 K/UL — SIGNIFICANT CHANGE UP (ref 1–3.3)
LYMPHOCYTES # BLD AUTO: 15.9 % — SIGNIFICANT CHANGE UP (ref 13–44)
MCHC RBC-ENTMCNC: 33.2 PG — SIGNIFICANT CHANGE UP (ref 27–34)
MCHC RBC-ENTMCNC: 35.3 GM/DL — SIGNIFICANT CHANGE UP (ref 32–36)
MCV RBC AUTO: 93.8 FL — SIGNIFICANT CHANGE UP (ref 80–100)
MONOCYTES # BLD AUTO: 0.66 K/UL — SIGNIFICANT CHANGE UP (ref 0–0.9)
MONOCYTES NFR BLD AUTO: 6.7 % — SIGNIFICANT CHANGE UP (ref 2–14)
NEUTROPHILS # BLD AUTO: 7.55 K/UL — HIGH (ref 1.8–7.4)
NEUTROPHILS NFR BLD AUTO: 76.3 % — SIGNIFICANT CHANGE UP (ref 43–77)
NRBC # BLD: 0 /100 WBCS — SIGNIFICANT CHANGE UP (ref 0–0)
PLATELET # BLD AUTO: 278 K/UL — SIGNIFICANT CHANGE UP (ref 150–400)
POTASSIUM SERPL-MCNC: 3.8 MMOL/L — SIGNIFICANT CHANGE UP (ref 3.5–5.3)
POTASSIUM SERPL-SCNC: 3.8 MMOL/L — SIGNIFICANT CHANGE UP (ref 3.5–5.3)
PROCALCITONIN SERPL-MCNC: 0.11 NG/ML — HIGH (ref 0.02–0.1)
PROT SERPL-MCNC: 7.2 G/DL — SIGNIFICANT CHANGE UP (ref 6–8.3)
RBC # BLD: 3.71 M/UL — LOW (ref 3.8–5.2)
RBC # FLD: 11.7 % — SIGNIFICANT CHANGE UP (ref 10.3–14.5)
SODIUM SERPL-SCNC: 135 MMOL/L — SIGNIFICANT CHANGE UP (ref 135–145)
WBC # BLD: 9.89 K/UL — SIGNIFICANT CHANGE UP (ref 3.8–10.5)
WBC # FLD AUTO: 9.89 K/UL — SIGNIFICANT CHANGE UP (ref 3.8–10.5)

## 2020-04-01 PROCEDURE — 99232 SBSQ HOSP IP/OBS MODERATE 35: CPT

## 2020-04-01 RX ADMIN — ENOXAPARIN SODIUM 40 MILLIGRAM(S): 100 INJECTION SUBCUTANEOUS at 12:00

## 2020-04-01 NOTE — PROGRESS NOTE ADULT - SUBJECTIVE AND OBJECTIVE BOX
[facility] Division of Hospital Medicine Progress Note    Patient is a 64y old  Female who presents with a chief complaint of SOB (31 Mar 2020 13:22)      SUBJECTIVE / OVERNIGHT EVENTS:  ADDITIONAL REVIEW OF SYSTEMS:    MEDICATIONS  (STANDING):  enoxaparin Injectable 40 milliGRAM(s) SubCutaneous daily  influenza   Vaccine 0.5 milliLiter(s) IntraMuscular once    MEDICATIONS  (PRN):  acetaminophen   Tablet .. 650 milliGRAM(s) Oral every 4 hours PRN Temp greater or equal to 38.5C (101.3F)  acetaminophen  Suppository .. 650 milliGRAM(s) Rectal every 4 hours PRN Temp greater or equal to 38.5C (101.3F)  ALBUTerol    90 MICROgram(s) HFA Inhaler 2 Puff(s) Inhalation every 6 hours PRN cough      CAPILLARY BLOOD GLUCOSE        I&O's Summary      PHYSICAL EXAM:  Vital Signs Last 24 Hrs  T(C): 37.1 (01 Apr 2020 12:59), Max: 37.1 (01 Apr 2020 12:59)  T(F): 98.8 (01 Apr 2020 12:59), Max: 98.8 (01 Apr 2020 12:59)  HR: 90 (01 Apr 2020 12:59) (86 - 90)  BP: 114/77 (01 Apr 2020 12:59) (114/77 - 125/79)  BP(mean): --  RR: 20 (01 Apr 2020 12:59) (20 - 20)  SpO2: 92% (01 Apr 2020 12:59) (91% - 92%)  CONSTITUTIONAL: NAD, well-developed, well-groomed  ENMT: Moist oral mucosa, no pharyngeal injection or exudates; normal dentition  RESPIRATORY: Normal respiratory effort; lungs are clear to auscultation bilaterally  CARDIOVASCULAR: Regular rate and rhythm, normal S1 and S2, no murmur/rub/gallop; No lower extremity edema; Peripheral pulses are 2+ bilaterally  ABDOMEN: Nontender to palpation, normoactive bowel sounds, no rebound/guarding; No hepatosplenomegaly  PSYCH: A+O to person, place, and time; affect appropriate  NEUROLOGY: CN 2-12 are intact and symmetric; no gross sensory deficits   SKIN: No rashes; no palpable lesions    LABS:                        12.3   9.89  )-----------( 278      ( 01 Apr 2020 08:24 )             34.8     04-01    135  |  95<L>  |  8   ----------------------------<  125<H>  3.8   |  27  |  0.46<L>    Ca    9.3      01 Apr 2020 08:24    TPro  7.2  /  Alb  3.5  /  TBili  0.6  /  DBili  x   /  AST  25  /  ALT  32  /  AlkPhos  85  04-01              Culture - Blood (collected 30 Mar 2020 17:00)  Source: .Blood Blood-Peripheral  Preliminary Report (31 Mar 2020 17:02):    No growth to date.    Culture - Blood (collected 30 Mar 2020 17:00)  Source: .Blood Blood-Peripheral  Preliminary Report (31 Mar 2020 17:02):    No growth to date.      COVID-19 PCR: Detected (03-29-20 @ 16:39)      RADIOLOGY & ADDITIONAL TESTS:  Imaging from Last 24 Hours:  Electrocardiogram/QTc Interval:    COORDINATION OF CARE:  Care Discussed with Consultants/Other Providers:

## 2020-04-01 NOTE — PROGRESS NOTE ADULT - PROBLEM SELECTOR PLAN 3
Found to have O2 sat to 93% on RA outpatient, 94% RA at the ED, now on 2L NC  - titrate up O2 requirements as per COVID protocol    O2 sat 91%  on 2Liters 31March  92% on 2 Liters   1April  She still needs O2  slowly improving

## 2020-04-01 NOTE — PROGRESS NOTE ADULT - ASSESSMENT
[facility] Division of Hospital Medicine Progress Note    Patient is a 64y old  Female who presents with a chief complaint of SOB (31 Mar 2020 13:22)    Discussion with  Services [phone] BRUCE   # [id number] 1 April 2020  PAtient reports she is feeling a bit better but still requires O2  SUBJECTIVE / OVERNIGHT EVENTS:92% on 2 Liters   1April  She still needs O2  slowly improving  ADDITIONAL REVIEW OF SYSTEMS:    MEDICATIONS  (STANDING):  enoxaparin Injectable 40 milliGRAM(s) SubCutaneous daily  influenza   Vaccine 0.5 milliLiter(s) IntraMuscular once    MEDICATIONS  (PRN):  acetaminophen   Tablet .. 650 milliGRAM(s) Oral every 4 hours PRN Temp greater or equal to 38.5C (101.3F)  acetaminophen  Suppository .. 650 milliGRAM(s) Rectal every 4 hours PRN Temp greater or equal to 38.5C (101.3F)  ALBUTerol    90 MICROgram(s) HFA Inhaler 2 Puff(s) Inhalation every 6 hours PRN cough      CAPILLARY BLOOD GLUCOSE        I&O's Summary      PHYSICAL EXAM:  Vital Signs Last 24 Hrs  T(C): 37.1 (01 Apr 2020 12:59), Max: 37.1 (01 Apr 2020 12:59)  T(F): 98.8 (01 Apr 2020 12:59), Max: 98.8 (01 Apr 2020 12:59)  HR: 90 (01 Apr 2020 12:59) (86 - 90)  BP: 114/77 (01 Apr 2020 12:59) (114/77 - 125/79)  BP(mean): --  RR: 20 (01 Apr 2020 12:59) (20 - 20)  SpO2: 92% (01 Apr 2020 12:59) (91% - 92%)  CONSTITUTIONAL: NAD, well-developed, well-groomed  ENMT: Moist oral mucosa, no pharyngeal injection or exudates; normal dentition  RESPIRATORY: Normal respiratory effort; lungs are clear to auscultation bilaterally  CARDIOVASCULAR: Regular rate and rhythm, normal S1 and S2, no murmur/rub/gallop; No lower extremity edema; Peripheral pulses are 2+ bilaterally  ABDOMEN: Nontender to palpation, normoactive bowel sounds, no rebound/guarding; No hepatosplenomegaly  PSYCH: A+O to person, place, and time; affect appropriate  NEUROLOGY: CN 2-12 are intact and symmetric; no gross sensory deficits   SKIN: No rashes; no palpable lesions    LABS:                        12.3   9.89  )-----------( 278      ( 01 Apr 2020 08:24 )             34.8     04-01    135  |  95<L>  |  8   ----------------------------<  125<H>  3.8   |  27  |  0.46<L>    Ca    9.3      01 Apr 2020 08:24    TPro  7.2  /  Alb  3.5  /  TBili  0.6  /  DBili  x   /  AST  25  /  ALT  32  /  AlkPhos  85  04-01              Culture - Blood (collected 30 Mar 2020 17:00)  Source: .Blood Blood-Peripheral  Preliminary Report (31 Mar 2020 17:02):    No growth to date.    Culture - Blood (collected 30 Mar 2020 17:00)  Source: .Blood Blood-Peripheral  Preliminary Report (31 Mar 2020 17:02):    No growth to date.      COVID-19 PCR: Detected (03-29-20 @ 16:39)      RADIOLOGY & ADDITIONAL TESTS:  Imaging from Last 24 Hours:  Electrocardiogram/QTc Interval:    COORDINATION OF CARE:  Care Discussed with Consultants/Other Providers:

## 2020-04-02 ENCOUNTER — TRANSCRIPTION ENCOUNTER (OUTPATIENT)
Age: 65
End: 2020-04-02

## 2020-04-02 LAB
ALBUMIN SERPL ELPH-MCNC: 3.4 G/DL — SIGNIFICANT CHANGE UP (ref 3.3–5)
ALP SERPL-CCNC: 95 U/L — SIGNIFICANT CHANGE UP (ref 40–120)
ALT FLD-CCNC: 47 U/L — HIGH (ref 10–45)
ANION GAP SERPL CALC-SCNC: 15 MMOL/L — SIGNIFICANT CHANGE UP (ref 5–17)
AST SERPL-CCNC: 28 U/L — SIGNIFICANT CHANGE UP (ref 10–40)
BASOPHILS # BLD AUTO: 0.02 K/UL — SIGNIFICANT CHANGE UP (ref 0–0.2)
BASOPHILS NFR BLD AUTO: 0.2 % — SIGNIFICANT CHANGE UP (ref 0–2)
BILIRUB SERPL-MCNC: 0.7 MG/DL — SIGNIFICANT CHANGE UP (ref 0.2–1.2)
BUN SERPL-MCNC: 9 MG/DL — SIGNIFICANT CHANGE UP (ref 7–23)
CALCIUM SERPL-MCNC: 9.2 MG/DL — SIGNIFICANT CHANGE UP (ref 8.4–10.5)
CHLORIDE SERPL-SCNC: 94 MMOL/L — LOW (ref 96–108)
CO2 SERPL-SCNC: 27 MMOL/L — SIGNIFICANT CHANGE UP (ref 22–31)
CREAT SERPL-MCNC: 0.43 MG/DL — LOW (ref 0.5–1.3)
EOSINOPHIL # BLD AUTO: 0.14 K/UL — SIGNIFICANT CHANGE UP (ref 0–0.5)
EOSINOPHIL NFR BLD AUTO: 1.7 % — SIGNIFICANT CHANGE UP (ref 0–6)
GLUCOSE SERPL-MCNC: 114 MG/DL — HIGH (ref 70–99)
HCT VFR BLD CALC: 32.7 % — LOW (ref 34.5–45)
HGB BLD-MCNC: 11.5 G/DL — SIGNIFICANT CHANGE UP (ref 11.5–15.5)
IMM GRANULOCYTES NFR BLD AUTO: 0.6 % — SIGNIFICANT CHANGE UP (ref 0–1.5)
LYMPHOCYTES # BLD AUTO: 1.81 K/UL — SIGNIFICANT CHANGE UP (ref 1–3.3)
LYMPHOCYTES # BLD AUTO: 22.4 % — SIGNIFICANT CHANGE UP (ref 13–44)
MCHC RBC-ENTMCNC: 32.6 PG — SIGNIFICANT CHANGE UP (ref 27–34)
MCHC RBC-ENTMCNC: 35.2 GM/DL — SIGNIFICANT CHANGE UP (ref 32–36)
MCV RBC AUTO: 92.6 FL — SIGNIFICANT CHANGE UP (ref 80–100)
MONOCYTES # BLD AUTO: 0.54 K/UL — SIGNIFICANT CHANGE UP (ref 0–0.9)
MONOCYTES NFR BLD AUTO: 6.7 % — SIGNIFICANT CHANGE UP (ref 2–14)
NEUTROPHILS # BLD AUTO: 5.52 K/UL — SIGNIFICANT CHANGE UP (ref 1.8–7.4)
NEUTROPHILS NFR BLD AUTO: 68.4 % — SIGNIFICANT CHANGE UP (ref 43–77)
NRBC # BLD: 0 /100 WBCS — SIGNIFICANT CHANGE UP (ref 0–0)
PLATELET # BLD AUTO: 284 K/UL — SIGNIFICANT CHANGE UP (ref 150–400)
POTASSIUM SERPL-MCNC: 3.4 MMOL/L — LOW (ref 3.5–5.3)
POTASSIUM SERPL-SCNC: 3.4 MMOL/L — LOW (ref 3.5–5.3)
PROT SERPL-MCNC: 7.1 G/DL — SIGNIFICANT CHANGE UP (ref 6–8.3)
RBC # BLD: 3.53 M/UL — LOW (ref 3.8–5.2)
RBC # FLD: 11.5 % — SIGNIFICANT CHANGE UP (ref 10.3–14.5)
SODIUM SERPL-SCNC: 136 MMOL/L — SIGNIFICANT CHANGE UP (ref 135–145)
WBC # BLD: 8.08 K/UL — SIGNIFICANT CHANGE UP (ref 3.8–10.5)
WBC # FLD AUTO: 8.08 K/UL — SIGNIFICANT CHANGE UP (ref 3.8–10.5)

## 2020-04-02 PROCEDURE — 99232 SBSQ HOSP IP/OBS MODERATE 35: CPT

## 2020-04-02 RX ORDER — POTASSIUM CHLORIDE 20 MEQ
40 PACKET (EA) ORAL ONCE
Refills: 0 | Status: COMPLETED | OUTPATIENT
Start: 2020-04-02 | End: 2020-04-02

## 2020-04-02 RX ORDER — ACETAMINOPHEN 500 MG
2 TABLET ORAL
Qty: 0 | Refills: 0 | DISCHARGE
Start: 2020-04-02

## 2020-04-02 RX ADMIN — ENOXAPARIN SODIUM 40 MILLIGRAM(S): 100 INJECTION SUBCUTANEOUS at 15:20

## 2020-04-02 RX ADMIN — Medication 40 MILLIEQUIVALENT(S): at 22:16

## 2020-04-02 NOTE — DIETITIAN INITIAL EVALUATION ADULT. - PROBLEM SELECTOR PLAN 1
Possible COVID19 exposure ~3-4 weeks prior, found to have declining O2 sat, CXR showing multifocal PNA  - f/u COVID19 PCR  - will add hydroxycholoroquine 400mg BID firs day, 200mg BID next 4days  - f/u QTc

## 2020-04-02 NOTE — DISCHARGE NOTE PROVIDER - NSDCMRMEDTOKEN_GEN_ALL_CORE_FT
acetaminophen 325 mg oral tablet: 2 tab(s) orally every 4 hours, As needed, Temp greater or equal to 38.5C (101.3F)  albuterol 90 mcg/inh inhalation aerosol: 2 puff(s) inhaled every 6 hours, As Needed

## 2020-04-02 NOTE — DIETITIAN INITIAL EVALUATION ADULT. - ADD RECOMMEND
Regular diet, Obtain HT and WT, Monitor diet tolerance, po intake, GI tolerance, weight trends, labs, and skin integrity , Vit C, Zinc, Thiamine

## 2020-04-02 NOTE — DISCHARGE NOTE PROVIDER - NSDCCPCAREPLAN_GEN_ALL_CORE_FT
PRINCIPAL DISCHARGE DIAGNOSIS  Diagnosis: Suspected 2019 novel coronavirus infection  Assessment and Plan of Treatment: You tested positive for COVID 19.  You no longer require hospitalization.  Please restrict activities outside of your home except for getting medical care.  Do not go to work, school, or public areas.  Avoid using public transportation, ride-sharing, or taxis.  Separate yourself from other people and animals in your home.  Call ahead before visiting your doctor.  Wear a facemask when you are around other people. Cover your cough and sneezes.  Clean your hands often.  Avoid sharing personal household items.  Clean all frequently touched surfaces daily.        SECONDARY DISCHARGE DIAGNOSES  Diagnosis: Hypoxia  Assessment and Plan of Treatment: PRINCIPAL DISCHARGE DIAGNOSIS  Diagnosis: Suspected 2019 novel coronavirus infection  Assessment and Plan of Treatment: You tested positive for COVID 19.  You no longer require hospitalization.  Please restrict activities outside of your home except for getting medical care.  Do not go to work, school, or public areas.  Avoid using public transportation, ride-sharing, or taxis.  Separate yourself from other people and animals in your home.  Call ahead before visiting your doctor.  Wear a facemask when you are around other people. Cover your cough and sneezes.  Clean your hands often.  Avoid sharing personal household items.  Clean all frequently touched surfaces daily.        SECONDARY DISCHARGE DIAGNOSES  Diagnosis: Hypoxia  Assessment and Plan of Treatment: stable

## 2020-04-02 NOTE — DIETITIAN INITIAL EVALUATION ADULT. - PERTINENT MEDS FT
MEDICATIONS  (STANDING):  enoxaparin Injectable 40 milliGRAM(s) SubCutaneous daily  influenza   Vaccine 0.5 milliLiter(s) IntraMuscular once    MEDICATIONS  (PRN):  acetaminophen   Tablet .. 650 milliGRAM(s) Oral every 4 hours PRN Temp greater or equal to 38.5C (101.3F)  acetaminophen  Suppository .. 650 milliGRAM(s) Rectal every 4 hours PRN Temp greater or equal to 38.5C (101.3F)  ALBUTerol    90 MICROgram(s) HFA Inhaler 2 Puff(s) Inhalation every 6 hours PRN cough

## 2020-04-02 NOTE — PROGRESS NOTE ADULT - SUBJECTIVE AND OBJECTIVE BOX
[facility] Division of Hospital Medicine Progress Note    Patient is a 64y old  Female who presents with a chief complaint of SOB       SUBJECTIVE / OVERNIGHT EVENTS:    ADDITIONAL REVIEW OF SYSTEMS:    MEDICATIONS  (STANDING):  enoxaparin Injectable 40 milliGRAM(s) SubCutaneous daily  influenza   Vaccine 0.5 milliLiter(s) IntraMuscular once    MEDICATIONS  (PRN):  acetaminophen   Tablet .. 650 milliGRAM(s) Oral every 4 hours PRN Temp greater or equal to 38.5C (101.3F)  acetaminophen  Suppository .. 650 milliGRAM(s) Rectal every 4 hours PRN Temp greater or equal to 38.5C (101.3F)  ALBUTerol    90 MICROgram(s) HFA Inhaler 2 Puff(s) Inhalation every 6 hours PRN cough      CAPILLARY BLOOD GLUCOSE        I&O's Summary      PHYSICAL EXAM:  Vital Signs Last 24 Hrs  T(C): 36.6 (02 Apr 2020 10:00), Max: 37 (02 Apr 2020 04:52)  T(F): 97.9 (02 Apr 2020 10:00), Max: 98.6 (02 Apr 2020 04:52)  HR: 99 (02 Apr 2020 11:00) (72 - 99)  BP: 112/69 (02 Apr 2020 04:52) (112/69 - 115/72)  BP(mean): --  RR: 22 (02 Apr 2020 11:00) (18 - 22)  SpO2: 89% (02 Apr 2020 11:00) (89% - 98%)  CONSTITUTIONAL: NAD, well-developed, well-groomed  ENMT: Moist oral mucosa, no pharyngeal injection or exudates; normal dentition  RESPIRATORY: Normal respiratory effort; lungs are clear to auscultation bilaterally  CARDIOVASCULAR: Regular rate and rhythm, normal S1 and S2, no murmur/rub/gallop; No lower extremity edema; Peripheral pulses are 2+ bilaterally  ABDOMEN: Nontender to palpation, normoactive bowel sounds, no rebound/guarding; No hepatosplenomegaly  PSYCH: A+O to person, place, and time; affect appropriate  NEUROLOGY: CN 2-12 are intact and symmetric; no gross sensory deficits   SKIN: No rashes; no palpable lesions    LABS:                        11.5   8.08  )-----------( 284      ( 02 Apr 2020 06:43 )             32.7     04-02    136  |  94<L>  |  9   ----------------------------<  114<H>  3.4<L>   |  27  |  0.43<L>    Ca    9.2      02 Apr 2020 06:49    TPro  7.1  /  Alb  3.4  /  TBili  0.7  /  DBili  x   /  AST  28  /  ALT  47<H>  /  AlkPhos  95  04-02              Culture - Blood (collected 30 Mar 2020 17:00)  Source: .Blood Blood-Peripheral  Preliminary Report (31 Mar 2020 17:02):    No growth to date.    Culture - Blood (collected 30 Mar 2020 17:00)  Source: .Blood Blood-Peripheral  Preliminary Report (31 Mar 2020 17:02):    No growth to date.      COVID-19 PCR: Detected (03-29-20 @ 16:39)      RADIOLOGY & ADDITIONAL TESTS:  Imaging from Last 24 Hours:  Electrocardiogram/QTc Interval:    COORDINATION OF CARE:  Care Discussed with Consultants/Other Providers:

## 2020-04-02 NOTE — PROGRESS NOTE ADULT - PROBLEM SELECTOR PLAN 3
O2 sat 98% 2L  96% RA  Desaturates to 89% RA with exertion  Continue supportive care    O2 sat 91%  on 2Liters 31March  92% on 2 Liters   1April  She still needs O2  slowly improving

## 2020-04-02 NOTE — DIETITIAN INITIAL EVALUATION ADULT. - OTHER INFO
Unable to conduct a face to face interview or nutrition-focused physical exam due to limited contact restrictions related to Pt's medical condition and isolation precautions. Information obtained via phone call with RN and from EMR.  2 attempts to call patient via  phone and directly to room.  ID# [id number] but patient did not answer.  As per RN, Patient sitting up in bed, eating OK.  No GI complaints noted.  Unfortunately, no HT or weight in computer base.  Advised RN to obtain if feasible.  Presents with no PMH.  Believes she was exposed at her job in [employer]. Noted with Low potassium of 3.4 and supplemented. Suggest Vit C, Thiamine and Zinc for immune function.

## 2020-04-02 NOTE — DISCHARGE NOTE PROVIDER - HOSPITAL COURSE
Patient is a 64y old  Female who presents with a chief complaint of SOB             Coronavirus infection.  Plan: Possible COVID19 exposure ~3-4 weeks prior, found to have declining O2 sat, CXR showing multifocal PNA    - COVID positive    - will hold off starting plaquenil, satting well on 2L currently - if respiratory status worsens or oxygen requirements increase, consider starting plaquenil    - Qtc 437 yesterday, continue to monitor.          Bandemia.  Plan: Found to have elevated bands on initial CBC, w/o leukocytosis    - In the setting of fevers, will get Bcx x2    - f/u urine legionella (collected)    - AVOID penicillins, pt develops SOB symptoms.          Hypoxia.  Plan: O2 sat 98% 2L    96% RA    Desaturates to 89% RA with exertion    Continue supportive care        O2 sat 91%  on 2Liters 31March    92% on 2 Liters   1April    She still needs O2  slowly improving.          Prophylactic measure.  Plan: GOC: Full code    Activity: ambulate as tolerated    Diet: Regular    DVT prophylaxis: lovenox 40mg. Patient is a 64y old  Female who presents with a chief complaint of SOB, found to have positive for COVID. Deferred Plaquenil given mild symptoms. Patient successfully titrated off O2 and satting well on RA. Noted to have bandemia, which was resolved. Legionella negative and BCx NGTD. Remains afebrile and stable. To be discharged home with close follow up with PCP as per Dr. Robert Patient is a 64y old  Female who presents with a chief complaint of SOB, found to have positive for COVID. Deferred Plaquenil given mild symptoms. Patient successfully titrated off O2 and satting well on RA. Noted to have bandemia, which was resolved. Legionella negative and BCx NGTD. Remains afebrile and stable. To be discharged home with close follow up with PCP.        Pt stable dc home with outpatient follow up with PMD.

## 2020-04-02 NOTE — DISCHARGE NOTE PROVIDER - NSDCFUADDAPPT_GEN_ALL_CORE_FT
follow up with PMD in 2-3 weeks for management of covid infection follow up with PMD in 2-3 weeks for management of covid infection  Please refer to your instruction regarding self quarantine.

## 2020-04-02 NOTE — DIETITIAN INITIAL EVALUATION ADULT. - PROBLEM SELECTOR PLAN 2
Found to have elevated bands on initial CBC, w/o leukocytosis  - In the setting of fevers, will get Bcx x2  - If COVID neg or procal elevated, will start  abx for CAP  - f/u urine legionella  - AVOID penicillins, pt develops SOB symptoms

## 2020-04-02 NOTE — PROGRESS NOTE ADULT - ASSESSMENT
[facility] Division of Hospital Medicine Progress Note    Patient is a 64y old  Female who presents with a chief complaint of SOB (31 Mar 2020 13:22)    Discussion with  Services [phone] BRUCE   # [id number] 1 April 2020  PAtient reports she is feeling a bit better but still requires O2  SUBJECTIVE / OVERNIGHT EVENTS:92% on 2 Liters   1April  She still needs O2  slowly improving  ADDITIONAL REVIEW OF SYSTEMS:    MEDICATIONS  (STANDING):  enoxaparin Injectable 40 milliGRAM(s) SubCutaneous daily  influenza   Vaccine 0.5 milliLiter(s) IntraMuscular once    MEDICATIONS  (PRN):  acetaminophen   Tablet .. 650 milliGRAM(s) Oral every 4 hours PRN Temp greater or equal to 38.5C (101.3F)  acetaminophen  Suppository .. 650 milliGRAM(s) Rectal every 4 hours PRN Temp greater or equal to 38.5C (101.3F)  ALBUTerol    90 MICROgram(s) HFA Inhaler 2 Puff(s) Inhalation every 6 hours PRN cough      CAPILLARY BLOOD GLUCOSE        I&O's Summary      PHYSICAL EXAM:  Vital Signs Last 24 Hrs  T(C): 37.1 (01 Apr 2020 12:59), Max: 37.1 (01 Apr 2020 12:59)  T(F): 98.8 (01 Apr 2020 12:59), Max: 98.8 (01 Apr 2020 12:59)  HR: 90 (01 Apr 2020 12:59) (86 - 90)  BP: 114/77 (01 Apr 2020 12:59) (114/77 - 125/79)  BP(mean): --  RR: 20 (01 Apr 2020 12:59) (20 - 20)  SpO2: 92% (01 Apr 2020 12:59) (91% - 92%)  CONSTITUTIONAL: NAD, well-developed, well-groomed  ENMT: Moist oral mucosa, no pharyngeal injection or exudates; normal dentition  RESPIRATORY: Normal respiratory effort; lungs are clear to auscultation bilaterally  CARDIOVASCULAR: Regular rate and rhythm, normal S1 and S2, no murmur/rub/gallop; No lower extremity edema; Peripheral pulses are 2+ bilaterally  ABDOMEN: Nontender to palpation, normoactive bowel sounds, no rebound/guarding; No hepatosplenomegaly  PSYCH: A+O to person, place, and time; affect appropriate  NEUROLOGY: CN 2-12 are intact and symmetric; no gross sensory deficits   SKIN: No rashes; no palpable lesions    LABS:                        12.3   9.89  )-----------( 278      ( 01 Apr 2020 08:24 )             34.8     04-01    135  |  95<L>  |  8   ----------------------------<  125<H>  3.8   |  27  |  0.46<L>    Ca    9.3      01 Apr 2020 08:24    TPro  7.2  /  Alb  3.5  /  TBili  0.6  /  DBili  x   /  AST  25  /  ALT  32  /  AlkPhos  85  04-01              Culture - Blood (collected 30 Mar 2020 17:00)  Source: .Blood Blood-Peripheral  Preliminary Report (31 Mar 2020 17:02):    No growth to date.    Culture - Blood (collected 30 Mar 2020 17:00)  Source: .Blood Blood-Peripheral  Preliminary Report (31 Mar 2020 17:02):    No growth to date.      COVID-19 PCR: Detected (03-29-20 @ 16:39)      RADIOLOGY & ADDITIONAL TESTS:  Imaging from Last 24 Hours:  Electrocardiogram/QTc Interval:    COORDINATION OF CARE:  Care Discussed with Consultants/Other Providers:

## 2020-04-03 LAB
ALBUMIN SERPL ELPH-MCNC: 3.1 G/DL — LOW (ref 3.3–5)
ALP SERPL-CCNC: 99 U/L — SIGNIFICANT CHANGE UP (ref 40–120)
ALT FLD-CCNC: 55 U/L — HIGH (ref 10–45)
ANION GAP SERPL CALC-SCNC: 13 MMOL/L — SIGNIFICANT CHANGE UP (ref 5–17)
AST SERPL-CCNC: 30 U/L — SIGNIFICANT CHANGE UP (ref 10–40)
BASOPHILS # BLD AUTO: 0.03 K/UL — SIGNIFICANT CHANGE UP (ref 0–0.2)
BASOPHILS NFR BLD AUTO: 0.4 % — SIGNIFICANT CHANGE UP (ref 0–2)
BILIRUB SERPL-MCNC: 0.6 MG/DL — SIGNIFICANT CHANGE UP (ref 0.2–1.2)
BUN SERPL-MCNC: 9 MG/DL — SIGNIFICANT CHANGE UP (ref 7–23)
CALCIUM SERPL-MCNC: 9.1 MG/DL — SIGNIFICANT CHANGE UP (ref 8.4–10.5)
CHLORIDE SERPL-SCNC: 98 MMOL/L — SIGNIFICANT CHANGE UP (ref 96–108)
CO2 SERPL-SCNC: 27 MMOL/L — SIGNIFICANT CHANGE UP (ref 22–31)
CREAT SERPL-MCNC: 0.47 MG/DL — LOW (ref 0.5–1.3)
CULTURE RESULTS: SIGNIFICANT CHANGE UP
CULTURE RESULTS: SIGNIFICANT CHANGE UP
D DIMER BLD IA.RAPID-MCNC: 3036 NG/ML DDU — HIGH
EOSINOPHIL # BLD AUTO: 0.14 K/UL — SIGNIFICANT CHANGE UP (ref 0–0.5)
EOSINOPHIL NFR BLD AUTO: 1.6 % — SIGNIFICANT CHANGE UP (ref 0–6)
GLUCOSE SERPL-MCNC: 117 MG/DL — HIGH (ref 70–99)
HCT VFR BLD CALC: 34.6 % — SIGNIFICANT CHANGE UP (ref 34.5–45)
HGB BLD-MCNC: 11.6 G/DL — SIGNIFICANT CHANGE UP (ref 11.5–15.5)
IMM GRANULOCYTES NFR BLD AUTO: 0.6 % — SIGNIFICANT CHANGE UP (ref 0–1.5)
LYMPHOCYTES # BLD AUTO: 1.49 K/UL — SIGNIFICANT CHANGE UP (ref 1–3.3)
LYMPHOCYTES # BLD AUTO: 17.5 % — SIGNIFICANT CHANGE UP (ref 13–44)
MCHC RBC-ENTMCNC: 30.4 PG — SIGNIFICANT CHANGE UP (ref 27–34)
MCHC RBC-ENTMCNC: 33.5 GM/DL — SIGNIFICANT CHANGE UP (ref 32–36)
MCV RBC AUTO: 90.6 FL — SIGNIFICANT CHANGE UP (ref 80–100)
MONOCYTES # BLD AUTO: 0.57 K/UL — SIGNIFICANT CHANGE UP (ref 0–0.9)
MONOCYTES NFR BLD AUTO: 6.7 % — SIGNIFICANT CHANGE UP (ref 2–14)
NEUTROPHILS # BLD AUTO: 6.24 K/UL — SIGNIFICANT CHANGE UP (ref 1.8–7.4)
NEUTROPHILS NFR BLD AUTO: 73.2 % — SIGNIFICANT CHANGE UP (ref 43–77)
NRBC # BLD: 0 /100 WBCS — SIGNIFICANT CHANGE UP (ref 0–0)
PLATELET # BLD AUTO: 343 K/UL — SIGNIFICANT CHANGE UP (ref 150–400)
POTASSIUM SERPL-MCNC: 4.1 MMOL/L — SIGNIFICANT CHANGE UP (ref 3.5–5.3)
POTASSIUM SERPL-SCNC: 4.1 MMOL/L — SIGNIFICANT CHANGE UP (ref 3.5–5.3)
PROT SERPL-MCNC: 7.3 G/DL — SIGNIFICANT CHANGE UP (ref 6–8.3)
RBC # BLD: 3.82 M/UL — SIGNIFICANT CHANGE UP (ref 3.8–5.2)
RBC # FLD: 11.4 % — SIGNIFICANT CHANGE UP (ref 10.3–14.5)
SODIUM SERPL-SCNC: 138 MMOL/L — SIGNIFICANT CHANGE UP (ref 135–145)
SPECIMEN SOURCE: SIGNIFICANT CHANGE UP
SPECIMEN SOURCE: SIGNIFICANT CHANGE UP
WBC # BLD: 8.52 K/UL — SIGNIFICANT CHANGE UP (ref 3.8–10.5)
WBC # FLD AUTO: 8.52 K/UL — SIGNIFICANT CHANGE UP (ref 3.8–10.5)

## 2020-04-03 PROCEDURE — 99232 SBSQ HOSP IP/OBS MODERATE 35: CPT

## 2020-04-03 RX ADMIN — ENOXAPARIN SODIUM 40 MILLIGRAM(S): 100 INJECTION SUBCUTANEOUS at 11:01

## 2020-04-03 RX ADMIN — ALBUTEROL 2 PUFF(S): 90 AEROSOL, METERED ORAL at 22:45

## 2020-04-03 NOTE — PROGRESS NOTE ADULT - ASSESSMENT
[facility] Division of Hospital Medicine Progress Note    Patient is a 64y old  Female who presents with a chief complaint of SOB (31 Mar 2020 13:22)    Discussion with  Services [phone] BRUCE   # [id number] 1 April 2020  PAtient reports she is feeling a bit better but still requires O2  SUBJECTIVE / OVERNIGHT EVENTS:92% on 2 Liters   1April  She still needs O2  slowly improving  ADDITIONAL REVIEW OF SYSTEMS:    MEDICATIONS  (STANDING):  enoxaparin Injectable 40 milliGRAM(s) SubCutaneous daily  influenza   Vaccine 0.5 milliLiter(s) IntraMuscular once    MEDICATIONS  (PRN):  acetaminophen   Tablet .. 650 milliGRAM(s) Oral every 4 hours PRN Temp greater or equal to 38.5C (101.3F)  acetaminophen  Suppository .. 650 milliGRAM(s) Rectal every 4 hours PRN Temp greater or equal to 38.5C (101.3F)  ALBUTerol    90 MICROgram(s) HFA Inhaler 2 Puff(s) Inhalation every 6 hours PRN cough      CAPILLARY BLOOD GLUCOSE        I&O's Summary      PHYSICAL EXAM:  Vital Signs Last 24 Hrs  T(C): 37.1 (01 Apr 2020 12:59), Max: 37.1 (01 Apr 2020 12:59)  T(F): 98.8 (01 Apr 2020 12:59), Max: 98.8 (01 Apr 2020 12:59)  HR: 90 (01 Apr 2020 12:59) (86 - 90)  BP: 114/77 (01 Apr 2020 12:59) (114/77 - 125/79)  BP(mean): --  RR: 20 (01 Apr 2020 12:59) (20 - 20)  SpO2: 92% (01 Apr 2020 12:59) (91% - 92%)  CONSTITUTIONAL: NAD, well-developed, well-groomed  ENMT: Moist oral mucosa, no pharyngeal injection or exudates; normal dentition  RESPIRATORY: Normal respiratory effort; lungs are clear to auscultation bilaterally  CARDIOVASCULAR: Regular rate and rhythm, normal S1 and S2, no murmur/rub/gallop; No lower extremity edema; Peripheral pulses are 2+ bilaterally  ABDOMEN: Nontender to palpation, normoactive bowel sounds, no rebound/guarding; No hepatosplenomegaly  PSYCH: A+O to person, place, and time; affect appropriate  NEUROLOGY: CN 2-12 are intact and symmetric; no gross sensory deficits   SKIN: No rashes; no palpable lesions    LABS:                        12.3   9.89  )-----------( 278      ( 01 Apr 2020 08:24 )             34.8     04-01    135  |  95<L>  |  8   ----------------------------<  125<H>  3.8   |  27  |  0.46<L>    Ca    9.3      01 Apr 2020 08:24    TPro  7.2  /  Alb  3.5  /  TBili  0.6  /  DBili  x   /  AST  25  /  ALT  32  /  AlkPhos  85  04-01              Culture - Blood (collected 30 Mar 2020 17:00)  Source: .Blood Blood-Peripheral  Preliminary Report (31 Mar 2020 17:02):    No growth to date.    Culture - Blood (collected 30 Mar 2020 17:00)  Source: .Blood Blood-Peripheral  Preliminary Report (31 Mar 2020 17:02):    No growth to date.      COVID-19 PCR: Detected (03-29-20 @ 16:39)      RADIOLOGY & ADDITIONAL TESTS:  Imaging from Last 24 Hours:  Electrocardiogram/QTc Interval:    COORDINATION OF CARE:  Care Discussed with Consultants/Other Providers:    O2 sat 94% 2L  91% RA  LOVENOX DVT Prophylaxis  Continue supportive care    O2 sat 91%  on 2Liters 31March  92% on 2 Liters   1April  She still needs O2  slowly improving    Today she walked 4 times with mild SOB.

## 2020-04-03 NOTE — PROGRESS NOTE ADULT - SUBJECTIVE AND OBJECTIVE BOX
[facility] Division of Hospital Medicine Progress Note    Patient is a 64y old  Female who presents with a chief complaint of SOB (02 Apr 2020 15:39)      SUBJECTIVE / OVERNIGHT EVENTS:  Feels much better today than before  ADDITIONAL REVIEW OF SYSTEMS:    MEDICATIONS  (STANDING):  enoxaparin Injectable 40 milliGRAM(s) SubCutaneous daily  influenza   Vaccine 0.5 milliLiter(s) IntraMuscular once    MEDICATIONS  (PRN):  acetaminophen   Tablet .. 650 milliGRAM(s) Oral every 4 hours PRN Temp greater or equal to 38.5C (101.3F)  acetaminophen  Suppository .. 650 milliGRAM(s) Rectal every 4 hours PRN Temp greater or equal to 38.5C (101.3F)  ALBUTerol    90 MICROgram(s) HFA Inhaler 2 Puff(s) Inhalation every 6 hours PRN cough      CAPILLARY BLOOD GLUCOSE        I&O's Summary      PHYSICAL EXAM:  Vital Signs Last 24 Hrs  T(C): 36.7 (03 Apr 2020 13:53), Max: 36.7 (02 Apr 2020 20:42)  T(F): 98 (03 Apr 2020 13:53), Max: 98 (02 Apr 2020 20:42)  HR: 75 (03 Apr 2020 13:53) (74 - 86)  BP: 100/66 (03 Apr 2020 13:53) (100/66 - 112/73)  BP(mean): --  RR: 18 (03 Apr 2020 13:53) (18 - 22)  SpO2: 94% (03 Apr 2020 13:53) (89% - 95%)  CONSTITUTIONAL: NAD, well-developed, well-groomed  ENMT: Moist oral mucosa, no pharyngeal injection or exudates; normal dentition  RESPIRATORY: Normal respiratory effort; lungs are clear to auscultation bilaterally  CARDIOVASCULAR: Regular rate and rhythm, normal S1 and S2, no murmur/rub/gallop; No lower extremity edema; Peripheral pulses are 2+ bilaterally  ABDOMEN: Nontender to palpation, normoactive bowel sounds, no rebound/guarding; No hepatosplenomegaly  PSYCH: A+O to person, place, and time; affect appropriate  NEUROLOGY: CN 2-12 are intact and symmetric; no gross sensory deficits   SKIN: No rashes; no palpable lesions    LABS:                        11.6   8.52  )-----------( 343      ( 03 Apr 2020 07:37 )             34.6     04-03    138  |  98  |  9   ----------------------------<  117<H>  4.1   |  27  |  0.47<L>    Ca    9.1      03 Apr 2020 07:36    TPro  7.3  /  Alb  3.1<L>  /  TBili  0.6  /  DBili  x   /  AST  30  /  ALT  55<H>  /  AlkPhos  99  04-03              COVID-19 PCR: Detected (03-29-20 @ 16:39)      RADIOLOGY & ADDITIONAL TESTS:  Imaging from Last 24 Hours:  Electrocardiogram/QTc Interval:    COORDINATION OF CARE:  Care Discussed with Consultants/Other Providers:

## 2020-04-03 NOTE — PROGRESS NOTE ADULT - PROBLEM SELECTOR PLAN 3
O2 sat 94% 2L  91% RA  LOVENOX DVT Prophylaxis  Continue supportive care    O2 sat 91%  on 2Liters 31March  92% on 2 Liters   1April  She still needs O2  slowly improving  Feeling better than before. D/W Patient  GALO # [id number]

## 2020-04-04 ENCOUNTER — TRANSCRIPTION ENCOUNTER (OUTPATIENT)
Age: 65
End: 2020-04-04

## 2020-04-04 VITALS
TEMPERATURE: 98 F | DIASTOLIC BLOOD PRESSURE: 65 MMHG | RESPIRATION RATE: 18 BRPM | OXYGEN SATURATION: 92 % | SYSTOLIC BLOOD PRESSURE: 103 MMHG | HEART RATE: 75 BPM

## 2020-04-04 LAB
ALBUMIN SERPL ELPH-MCNC: 3.1 G/DL — LOW (ref 3.3–5)
ALP SERPL-CCNC: 99 U/L — SIGNIFICANT CHANGE UP (ref 40–120)
ALT FLD-CCNC: 58 U/L — HIGH (ref 10–45)
ANION GAP SERPL CALC-SCNC: 15 MMOL/L — SIGNIFICANT CHANGE UP (ref 5–17)
AST SERPL-CCNC: 26 U/L — SIGNIFICANT CHANGE UP (ref 10–40)
BILIRUB SERPL-MCNC: 0.4 MG/DL — SIGNIFICANT CHANGE UP (ref 0.2–1.2)
BUN SERPL-MCNC: 9 MG/DL — SIGNIFICANT CHANGE UP (ref 7–23)
CALCIUM SERPL-MCNC: 8.9 MG/DL — SIGNIFICANT CHANGE UP (ref 8.4–10.5)
CHLORIDE SERPL-SCNC: 96 MMOL/L — SIGNIFICANT CHANGE UP (ref 96–108)
CO2 SERPL-SCNC: 25 MMOL/L — SIGNIFICANT CHANGE UP (ref 22–31)
CREAT SERPL-MCNC: 0.42 MG/DL — LOW (ref 0.5–1.3)
CRP SERPL-MCNC: 4.04 MG/DL — HIGH (ref 0–0.4)
FERRITIN SERPL-MCNC: 1038 NG/ML — HIGH (ref 15–150)
GLUCOSE SERPL-MCNC: 147 MG/DL — HIGH (ref 70–99)
HCT VFR BLD CALC: 34.5 % — SIGNIFICANT CHANGE UP (ref 34.5–45)
HGB BLD-MCNC: 11.6 G/DL — SIGNIFICANT CHANGE UP (ref 11.5–15.5)
LDH SERPL L TO P-CCNC: 268 U/L — HIGH (ref 50–242)
MCHC RBC-ENTMCNC: 30.9 PG — SIGNIFICANT CHANGE UP (ref 27–34)
MCHC RBC-ENTMCNC: 33.6 GM/DL — SIGNIFICANT CHANGE UP (ref 32–36)
MCV RBC AUTO: 92 FL — SIGNIFICANT CHANGE UP (ref 80–100)
NRBC # BLD: 0 /100 WBCS — SIGNIFICANT CHANGE UP (ref 0–0)
PLATELET # BLD AUTO: 367 K/UL — SIGNIFICANT CHANGE UP (ref 150–400)
POTASSIUM SERPL-MCNC: 3.9 MMOL/L — SIGNIFICANT CHANGE UP (ref 3.5–5.3)
POTASSIUM SERPL-SCNC: 3.9 MMOL/L — SIGNIFICANT CHANGE UP (ref 3.5–5.3)
PROCALCITONIN SERPL-MCNC: 0.05 NG/ML — SIGNIFICANT CHANGE UP (ref 0.02–0.1)
PROT SERPL-MCNC: 7.1 G/DL — SIGNIFICANT CHANGE UP (ref 6–8.3)
RBC # BLD: 3.75 M/UL — LOW (ref 3.8–5.2)
RBC # FLD: 11.4 % — SIGNIFICANT CHANGE UP (ref 10.3–14.5)
SODIUM SERPL-SCNC: 136 MMOL/L — SIGNIFICANT CHANGE UP (ref 135–145)
WBC # BLD: 7.84 K/UL — SIGNIFICANT CHANGE UP (ref 3.8–10.5)
WBC # FLD AUTO: 7.84 K/UL — SIGNIFICANT CHANGE UP (ref 3.8–10.5)

## 2020-04-04 PROCEDURE — 99233 SBSQ HOSP IP/OBS HIGH 50: CPT

## 2020-04-04 RX ADMIN — ALBUTEROL 2 PUFF(S): 90 AEROSOL, METERED ORAL at 05:40

## 2020-04-04 NOTE — PROGRESS NOTE ADULT - SUBJECTIVE AND OBJECTIVE BOX
[facility] Division of Hospital Medicine Progress Note    Patient is a 64y old  Female who presents with a chief complaint of SOB (03 Apr 2020 15:45)      SUBJECTIVE / OVERNIGHT EVENTS:  Afebrile overnight. Patient currently satting 96% on 2L NC O2. Spoke with patient via  [id number] - she is feeling better, had SOB with ambulation yesterday but slowly is improving.  ADDITIONAL REVIEW OF SYSTEMS:    MEDICATIONS  (STANDING):  enoxaparin Injectable 40 milliGRAM(s) SubCutaneous daily  influenza   Vaccine 0.5 milliLiter(s) IntraMuscular once    MEDICATIONS  (PRN):  acetaminophen   Tablet .. 650 milliGRAM(s) Oral every 4 hours PRN Temp greater or equal to 38.5C (101.3F)  acetaminophen  Suppository .. 650 milliGRAM(s) Rectal every 4 hours PRN Temp greater or equal to 38.5C (101.3F)  ALBUTerol    90 MICROgram(s) HFA Inhaler 2 Puff(s) Inhalation every 6 hours PRN cough      CAPILLARY BLOOD GLUCOSE        I&O's Summary    03 Apr 2020 07:01  -  04 Apr 2020 07:00  --------------------------------------------------------  IN: 240 mL / OUT: 0 mL / NET: 240 mL        PHYSICAL EXAM:  Vital Signs Last 24 Hrs  T(C): 36.8 (04 Apr 2020 05:49), Max: 36.8 (03 Apr 2020 21:24)  T(F): 98.3 (04 Apr 2020 05:49), Max: 98.3 (03 Apr 2020 21:24)  HR: 72 (04 Apr 2020 05:49) (69 - 75)  BP: 110/74 (04 Apr 2020 05:49) (100/66 - 127/83)  BP(mean): --  RR: 18 (04 Apr 2020 09:41) (18 - 19)  SpO2: 97% (04 Apr 2020 09:41) (91% - 97%)    CONSTITUTIONAL: NAD, well-developed, well-groomed  ENMT: Moist oral mucosa, no pharyngeal injection or exudates; normal dentition  RESPIRATORY: Normal respiratory effort; lungs are clear to auscultation bilaterally  CARDIOVASCULAR: Regular rate and rhythm, normal S1 and S2, no murmur/rub/gallop; No lower extremity edema; Peripheral pulses are 2+ bilaterally  ABDOMEN: Nontender to palpation, normoactive bowel sounds, no rebound/guarding; No hepatosplenomegaly  PSYCH: A+O to person, place, and time; affect appropriate  NEUROLOGY: CN 2-12 are intact and symmetric; no gross sensory deficits   SKIN: No rashes; no palpable lesions    LABS:                        11.6   7.84  )-----------( 367      ( 04 Apr 2020 07:48 )             34.5     04-04    136  |  96  |  9   ----------------------------<  147<H>  3.9   |  25  |  0.42<L>    Ca    8.9      04 Apr 2020 07:56    TPro  7.1  /  Alb  3.1<L>  /  TBili  0.4  /  DBili  x   /  AST  26  /  ALT  58<H>  /  AlkPhos  99  04-04              COVID-19 PCR: Detected (03-29-20 @ 16:39)

## 2020-04-04 NOTE — PROGRESS NOTE ADULT - PROBLEM SELECTOR PLAN 1
Possible COVID19 exposure ~3-4 weeks prior, found to have declining O2 sat, CXR showing multifocal PNA  - COVID positive  - will hold off starting plaquenil, satting well on 2L currently - if respiratory status worsens or oxygen requirements increase, consider starting plaquenil

## 2020-04-04 NOTE — PROGRESS NOTE ADULT - ASSESSMENT
Pt is a 65 yo F w/ no medical history, presenting for 12 days of worsening SOB, fevers, and coughs. CXR concerning for multifocal PNA, ?exposure to possible COVID - now COVID positive.

## 2020-04-04 NOTE — DISCHARGE NOTE NURSING/CASE MANAGEMENT/SOCIAL WORK - PATIENT PORTAL LINK FT
You can access the FollowMyHealth Patient Portal offered by [facility] by registering at the following website: [email or web address]. By joining [facility]’s FollowMyHealth portal, you will also be able to view your health information using other applications (apps) compatible with our system.

## 2020-04-04 NOTE — PROGRESS NOTE ADULT - PROBLEM SELECTOR PLAN 3
O2 sat 97% 2L today  91% RA yesterday  Continue supportive care  Check RA O2 sat today and with ambulation

## 2020-04-04 NOTE — DISCHARGE NOTE NURSING/CASE MANAGEMENT/SOCIAL WORK - NSDCFUADDAPPT_GEN_ALL_CORE_FT
follow up with PMD in 2-3 weeks for management of covid infection  Please refer to your instruction regarding self quarantine.

## 2020-08-13 ENCOUNTER — EMERGENCY (EMERGENCY)
Facility: HOSPITAL | Age: 65
LOS: 1 days | Discharge: HOME | End: 2020-08-13
Attending: EMERGENCY MEDICINE
Payer: COMMERCIAL

## 2020-08-13 VITALS
WEIGHT: 119.93 LBS | OXYGEN SATURATION: 96 % | HEART RATE: 87 BPM | RESPIRATION RATE: 18 BRPM | TEMPERATURE: 99 F | SYSTOLIC BLOOD PRESSURE: 114 MMHG | DIASTOLIC BLOOD PRESSURE: 81 MMHG | HEIGHT: 60 IN

## 2020-08-13 LAB
ALBUMIN SERPL ELPH-MCNC: 4.7 G/DL — SIGNIFICANT CHANGE UP (ref 3.3–5)
ALP SERPL-CCNC: 158 U/L — HIGH (ref 40–120)
ALT FLD-CCNC: 177 U/L — HIGH (ref 10–45)
ANION GAP SERPL CALC-SCNC: 16 MMOL/L — SIGNIFICANT CHANGE UP (ref 5–17)
APPEARANCE UR: ABNORMAL
AST SERPL-CCNC: 47 U/L — HIGH (ref 10–40)
BACTERIA # UR AUTO: NEGATIVE — SIGNIFICANT CHANGE UP
BASE EXCESS BLDV CALC-SCNC: 1.7 MMOL/L — SIGNIFICANT CHANGE UP (ref -2–2)
BASOPHILS # BLD AUTO: 0.05 K/UL — SIGNIFICANT CHANGE UP (ref 0–0.2)
BASOPHILS NFR BLD AUTO: 0.8 % — SIGNIFICANT CHANGE UP (ref 0–2)
BILIRUB SERPL-MCNC: 1.6 MG/DL — HIGH (ref 0.2–1.2)
BILIRUB UR-MCNC: ABNORMAL
BUN SERPL-MCNC: 9 MG/DL — SIGNIFICANT CHANGE UP (ref 7–23)
CA-I SERPL-SCNC: 1.19 MMOL/L — SIGNIFICANT CHANGE UP (ref 1.12–1.3)
CALCIUM SERPL-MCNC: 9.8 MG/DL — SIGNIFICANT CHANGE UP (ref 8.4–10.5)
CHLORIDE BLDV-SCNC: 100 MMOL/L — SIGNIFICANT CHANGE UP (ref 96–108)
CHLORIDE SERPL-SCNC: 97 MMOL/L — SIGNIFICANT CHANGE UP (ref 96–108)
CO2 BLDV-SCNC: 30 MMOL/L — SIGNIFICANT CHANGE UP (ref 22–30)
CO2 SERPL-SCNC: 23 MMOL/L — SIGNIFICANT CHANGE UP (ref 22–31)
COLOR SPEC: ABNORMAL
CREAT SERPL-MCNC: 0.64 MG/DL — SIGNIFICANT CHANGE UP (ref 0.5–1.3)
DIFF PNL FLD: NEGATIVE — SIGNIFICANT CHANGE UP
EOSINOPHIL # BLD AUTO: 0.15 K/UL — SIGNIFICANT CHANGE UP (ref 0–0.5)
EOSINOPHIL NFR BLD AUTO: 2.4 % — SIGNIFICANT CHANGE UP (ref 0–6)
EPI CELLS # UR: 2 /HPF — SIGNIFICANT CHANGE UP
GAS PNL BLDV: 139 MMOL/L — SIGNIFICANT CHANGE UP (ref 135–145)
GAS PNL BLDV: SIGNIFICANT CHANGE UP
GAS PNL BLDV: SIGNIFICANT CHANGE UP
GLUCOSE BLDV-MCNC: 106 MG/DL — HIGH (ref 70–99)
GLUCOSE SERPL-MCNC: 105 MG/DL — HIGH (ref 70–99)
GLUCOSE UR QL: NEGATIVE — SIGNIFICANT CHANGE UP
HCO3 BLDV-SCNC: 28 MMOL/L — SIGNIFICANT CHANGE UP (ref 21–29)
HCT VFR BLD CALC: 43.9 % — SIGNIFICANT CHANGE UP (ref 34.5–45)
HCT VFR BLDA CALC: 47 % — SIGNIFICANT CHANGE UP (ref 39–50)
HGB BLD CALC-MCNC: 15.2 G/DL — SIGNIFICANT CHANGE UP (ref 11.5–15.5)
HGB BLD-MCNC: 14.5 G/DL — SIGNIFICANT CHANGE UP (ref 11.5–15.5)
HYALINE CASTS # UR AUTO: 3 /LPF — HIGH (ref 0–2)
IMM GRANULOCYTES NFR BLD AUTO: 1.1 % — SIGNIFICANT CHANGE UP (ref 0–1.5)
KETONES UR-MCNC: NEGATIVE — SIGNIFICANT CHANGE UP
LACTATE BLDV-MCNC: 1.8 MMOL/L — SIGNIFICANT CHANGE UP (ref 0.7–2)
LEUKOCYTE ESTERASE UR-ACNC: NEGATIVE — SIGNIFICANT CHANGE UP
LIDOCAIN IGE QN: 40 U/L — SIGNIFICANT CHANGE UP (ref 7–60)
LYMPHOCYTES # BLD AUTO: 2.33 K/UL — SIGNIFICANT CHANGE UP (ref 1–3.3)
LYMPHOCYTES # BLD AUTO: 37.3 % — SIGNIFICANT CHANGE UP (ref 13–44)
MCHC RBC-ENTMCNC: 29.8 PG — SIGNIFICANT CHANGE UP (ref 27–34)
MCHC RBC-ENTMCNC: 33 GM/DL — SIGNIFICANT CHANGE UP (ref 32–36)
MCV RBC AUTO: 90.3 FL — SIGNIFICANT CHANGE UP (ref 80–100)
MONOCYTES # BLD AUTO: 0.65 K/UL — SIGNIFICANT CHANGE UP (ref 0–0.9)
MONOCYTES NFR BLD AUTO: 10.4 % — SIGNIFICANT CHANGE UP (ref 2–14)
NEUTROPHILS # BLD AUTO: 3 K/UL — SIGNIFICANT CHANGE UP (ref 1.8–7.4)
NEUTROPHILS NFR BLD AUTO: 48 % — SIGNIFICANT CHANGE UP (ref 43–77)
NITRITE UR-MCNC: NEGATIVE — SIGNIFICANT CHANGE UP
NRBC # BLD: 0 /100 WBCS — SIGNIFICANT CHANGE UP (ref 0–0)
OTHER CELLS CSF MANUAL: 6 ML/DL — LOW (ref 18–22)
PCO2 BLDV: 53 MMHG — HIGH (ref 35–50)
PH BLDV: 7.34 — LOW (ref 7.35–7.45)
PH UR: 5.5 — SIGNIFICANT CHANGE UP (ref 5–8)
PLATELET # BLD AUTO: 225 K/UL — SIGNIFICANT CHANGE UP (ref 150–400)
PO2 BLDV: 22 MMHG — LOW (ref 25–45)
POTASSIUM BLDV-SCNC: 3.2 MMOL/L — LOW (ref 3.5–5.3)
POTASSIUM SERPL-MCNC: 3.3 MMOL/L — LOW (ref 3.5–5.3)
POTASSIUM SERPL-SCNC: 3.3 MMOL/L — LOW (ref 3.5–5.3)
PROT SERPL-MCNC: 8.3 G/DL — SIGNIFICANT CHANGE UP (ref 6–8.3)
PROT UR-MCNC: ABNORMAL
RBC # BLD: 4.86 M/UL — SIGNIFICANT CHANGE UP (ref 3.8–5.2)
RBC # FLD: 12 % — SIGNIFICANT CHANGE UP (ref 10.3–14.5)
RBC CASTS # UR COMP ASSIST: 2 /HPF — SIGNIFICANT CHANGE UP (ref 0–4)
SAO2 % BLDV: 30 % — LOW (ref 67–88)
SARS-COV-2 RNA SPEC QL NAA+PROBE: SIGNIFICANT CHANGE UP
SODIUM SERPL-SCNC: 136 MMOL/L — SIGNIFICANT CHANGE UP (ref 135–145)
SP GR SPEC: 1.02 — SIGNIFICANT CHANGE UP (ref 1.01–1.02)
UROBILINOGEN FLD QL: ABNORMAL
WBC # BLD: 6.25 K/UL — SIGNIFICANT CHANGE UP (ref 3.8–10.5)
WBC # FLD AUTO: 6.25 K/UL — SIGNIFICANT CHANGE UP (ref 3.8–10.5)
WBC UR QL: 2 /HPF — SIGNIFICANT CHANGE UP (ref 0–5)

## 2020-08-13 PROCEDURE — 99285 EMERGENCY DEPT VISIT HI MDM: CPT

## 2020-08-13 PROCEDURE — 76705 ECHO EXAM OF ABDOMEN: CPT | Mod: 26,RT

## 2020-08-13 PROCEDURE — 71046 X-RAY EXAM CHEST 2 VIEWS: CPT | Mod: 26

## 2020-08-13 RX ORDER — POTASSIUM CHLORIDE 20 MEQ
40 PACKET (EA) ORAL ONCE
Refills: 0 | Status: COMPLETED | OUTPATIENT
Start: 2020-08-13 | End: 2020-08-13

## 2020-08-13 RX ORDER — SODIUM CHLORIDE 9 MG/ML
1000 INJECTION INTRAMUSCULAR; INTRAVENOUS; SUBCUTANEOUS ONCE
Refills: 0 | Status: COMPLETED | OUTPATIENT
Start: 2020-08-13 | End: 2020-08-13

## 2020-08-13 RX ORDER — PIPERACILLIN AND TAZOBACTAM 4; .5 G/20ML; G/20ML
3.38 INJECTION, POWDER, LYOPHILIZED, FOR SOLUTION INTRAVENOUS ONCE
Refills: 0 | Status: COMPLETED | OUTPATIENT
Start: 2020-08-13 | End: 2020-08-13

## 2020-08-13 RX ADMIN — PIPERACILLIN AND TAZOBACTAM 200 GRAM(S): 4; .5 INJECTION, POWDER, LYOPHILIZED, FOR SOLUTION INTRAVENOUS at 20:17

## 2020-08-13 RX ADMIN — Medication 40 MILLIEQUIVALENT(S): at 20:16

## 2020-08-13 RX ADMIN — SODIUM CHLORIDE 1000 MILLILITER(S): 9 INJECTION INTRAMUSCULAR; INTRAVENOUS; SUBCUTANEOUS at 18:36

## 2020-08-13 NOTE — ED PROVIDER NOTE - CLINICAL SUMMARY MEDICAL DECISION MAKING FREE TEXT BOX
65 y/o F, no PMH, presents to ED c/o fever/chills, HA, sore throat, abd pain, and N/V since sunday. Was told she had issue with liver enzymes by her doctor.  Here VSS. Non-toxic appearing. Physical exam significant for epigastric tenderness.  Given history will do infectious work up.   Will check CBC, CMP, Lipase, CXR, RUQ US, UA, COVId swab, VBG, and IVF. Reassess. 65 y/o F, no PMH, presents to ED c/o fever/chills, HA, sore throat, abd pain, and N/V since Sunday. Was told she had issue with liver enzymes by her doctor.  Here VSS. Non-toxic appearing. Physical exam significant for epigastric tenderness.  Given history will do infectious work up.   Will check CBC, CMP, Lipase, CXR, RUQ US, UA, COVId swab, VBG, and IVF. Reassess. 63 y/o F, no PMH, presents to ED c/o fever/chills, HA, sore throat, abd pain, and N/V since Sunday. Was told she had issue with liver enzymes by her doctor.  Here VSS. Non-toxic appearing. Physical exam significant for epigastric tenderness.  Given history will do infectious work up.   Will check CBC, CMP, Lipase, CXR, RUQ US, UA, COVId swab, VBG, and IVF.  admission and antibiotics ZR

## 2020-08-13 NOTE — ED ADULT NURSE NOTE - CHIEF COMPLAINT QUOTE
fever, headache, sore throat, abd pain, vomiting, dizzy, denies cough, denies SOB.  Pt speaks Italian, spoke to daughter via phone, pt travelled to [place] from 7/1 -7/13.   Tested covid (+) in March 2020

## 2020-08-13 NOTE — ED PROVIDER NOTE - NS ED ROS FT
Gen: Denies weight loss, +fevers  HEENT: Denies vision changes, ear pain, epistaxis; +sore throat  CV: Denies chest pain, palpitations  Skin: Denies rash, erythema, color changes  Resp: Denies SOB, cough  Endo: Denies sensitivity to heat, cold, increased urination  GI: Denies diarrhea, +abdominal pain, +nausea, +vomiting  Msk: Denies back pain, LE swelling, extremity pain  : Denies dysuria, increased frequency  Neuro: Denies LOC, weakness, numbness, tingling  Psych: Denies hx of psych, hallucinations  ROS statement: all other ROS negative except as per HPI

## 2020-08-13 NOTE — ED PROVIDER NOTE - PHYSICAL EXAMINATION
PHYSICAL EXAM:  GENERAL: non-toxic appearing; in no respiratory distress  HEAD: Atraumatic, Normocephalic  EYES: PERRL, EOMs intact b/l w/out deficits, no conjunctival pallor  NECK: No JVD; FROM  CHEST/LUNG: CTAB no wheezes/rhonchi/rales  HEART: RRR no murmur/gallops/rubs  ABDOMEN: +BS, soft, ND, Epigastric TTP, no rebound/guarding  EXTREMITIES: No LE edema, +2 radial pulses b/l, +2 DP/PT pulses b/l  MUSCULOSKELETAL: FROM of all 4 extremities  NERVOUS SYSTEM:  A&Ox3, No motor deficits or sensory deficits; CNII-XII intact; no focal neurologic deficits  SKIN:  No new rashes

## 2020-08-13 NOTE — ED PROVIDER NOTE - NSFOLLOWUPINSTRUCTIONS_ED_ALL_ED_FT
- Please follow up with your Primary Care Doctor within 48 hours to discuss today's visit.     - Please follow up with Gastroenterologist (information provided) within 48 hours for more definitive management.    - Tylenol up to 650 mg every 8 hours as needed for pain. DON'T TAKE MORE THAN THE RECOMMENDED DOSING RANGE ON THE BOX INSTRUCTIONS.     - Be sure to return to the ED if you develop new or worsening symptoms. Specific signs and symptoms to be vigilant of: fever or chills, chest pain, difficulty breathing, palpitations, loss of consciousness, headache, vision changes, abdominal pain, nausea or vomiting, diarrhea, constipation, blood in the stool or urine, pain on urination, difficulty urinating or any other distressing symptoms.

## 2020-08-13 NOTE — ED ADULT NURSE NOTE - OBJECTIVE STATEMENT
64 yr old female came in after vomiting on sunday with chills subjective fever. went to doctor and they said her liver is bad, as in the blood work shows something wrong with liver enzymes but she is not sure. pt has no medical problems. on assessment a and o x 3 lungs clear abd soft non tender no swelling in extremities no n/v/d no fevers her. no other complaints.

## 2020-08-13 NOTE — ED ADULT NURSE REASSESSMENT NOTE - NS ED NURSE REASSESS COMMENT FT1
pt resting on stretcher, pt alert and oriented x3, speaking full clear sentences, respirations non-labored, skin warm dry and intact, strong pulses throughout, abd is soft and non-distended, pt moving all extremities spontaneously, vital signs stable, no needs at this itme, rn will continue to monitor.

## 2020-08-13 NOTE — ED PROVIDER NOTE - NSFOLLOWUPCLINICS_GEN_ALL_ED_FT
[facility]  Gastroenterology  [address] [place] [id number]  Phone: [phone]  Fax:   Follow Up Time: 1-3 Days

## 2020-08-13 NOTE — ED PROVIDER NOTE - PATIENT PORTAL LINK FT
You can access the FollowMyHealth Patient Portal offered by [facility] by registering at the following website: [email or web address]. By joining [employer]’s FollowMyHealth portal, you will also be able to view your health information using other applications (apps) compatible with our system.

## 2020-08-13 NOTE — ED PROVIDER NOTE - OBJECTIVE STATEMENT
65 y/o F, no PMH, presents to ED c/o fever/chills, HA, sore throat, abd pain, and N/V since sunday. Pt states that she went to see her doctor on Tuesday and was told her "liver is bad", because bloodwork showed something wrong with her liver enzymes. Per patient the doctor told her to come to hospital for liver US and CXR. Of note, pt tested COVID+ in March 2020. She also recently traveled to [place] (71-7/13), upon returning she quarantined for two weeks and has been home without issue until now. Denies CP, SOB, cough, diarrhea, or urinary symptoms.

## 2020-08-13 NOTE — ED ADULT NURSE REASSESSMENT NOTE - NS ED NURSE REASSESS COMMENT FT1
pt resting on stretcher, pt report slight abd pain but its tolerable,  no change in condition, vital signs stable, awaiting ultrasound results, rn will continue to monitor.

## 2020-08-13 NOTE — ED PROVIDER NOTE - PROGRESS NOTE DETAILS
Silvio, PGY2: given pts reported fevers/chills and elevated LFT's, concern for possible subacute cholangitis covering with abx and will do bcx  Pt going to US now. Silvio, PGY2: patient received Zosyn at 8pm without noticing she's allergic to Penicillin, her allergy is documented as shortness of breath, she currently has no symptoms she's awake alert in no distress/no rash/no sob/vitals stable, will observe Silvio, PGY2: spoke with patient with  (ID [id number]), she states she had vomiting and some chills Saturday evening and then jaundice/abdominal pain starting Monday, outpt labs shows LFT elevation, she came to ED today for Xray of chest and Ultrasound RUQ. She took 325mg tylenol every 7 hours starting after her symptoms began, other meds she has are zofran and ppi. Ultrasound showing no GB path, Hepatic steatosis, explained results to patient. She's feeling better at this time without abdominal pain, not tender on exam ,she's eating/drinking. Explained to patient that she needs GI follow up. She understands return precautions.

## 2020-08-13 NOTE — ED ADULT TRIAGE NOTE - CHIEF COMPLAINT QUOTE
fever, headache, sore throat, abd pain, vomiting, dizzy, denies cough, denies SOB.  Pt speaks Indonesian, spoke to daughter via phone, pt travelled to [place] from 7/1 -7/13.   Tested covid (+) in March 2020

## 2020-08-14 VITALS
HEART RATE: 78 BPM | SYSTOLIC BLOOD PRESSURE: 133 MMHG | OXYGEN SATURATION: 99 % | RESPIRATION RATE: 16 BRPM | DIASTOLIC BLOOD PRESSURE: 85 MMHG

## 2020-08-14 LAB
CULTURE RESULTS: SIGNIFICANT CHANGE UP
SPECIMEN SOURCE: SIGNIFICANT CHANGE UP

## 2020-08-19 LAB
CULTURE RESULTS: SIGNIFICANT CHANGE UP
CULTURE RESULTS: SIGNIFICANT CHANGE UP
SPECIMEN SOURCE: SIGNIFICANT CHANGE UP
SPECIMEN SOURCE: SIGNIFICANT CHANGE UP

## 2021-07-09 NOTE — DIETITIAN INITIAL EVALUATION ADULT. - PROBLEM SELECTOR PLAN 3
Found to have O2 sat to 93% on RA outpatient, 94% RA at the ED, now on 2L NC  - titrate up O2 requirements as per COVID protocol  - if on nonrebreather, will consider adding salumedrol at 1mg/kg BID dosing, for 3-5 days as per [facility] pulm/icu guidelines 38[address] Detailed

## 2022-05-27 NOTE — ED ADULT NURSE NOTE - DOES PATIENT HAVE ADVANCE DIRECTIVE
Quality 130: Documentation Of Current Medications In The Medical Record: Current Medications Documented
Detail Level: Detailed
Quality 226: Preventive Care And Screening: Tobacco Use: Screening And Cessation Intervention: Patient screened for tobacco use and is an ex/non-smoker
No

## 2022-08-06 RX ORDER — ACETAMINOPHEN 500 MG
2 TABLET ORAL
Qty: 0 | Refills: 0 | DISCHARGE

## 2022-08-06 RX ORDER — ALBUTEROL 90 UG/1
2 AEROSOL, METERED ORAL
Qty: 0 | Refills: 0 | DISCHARGE

## 2023-04-25 NOTE — H&P ADULT - NSICDXNOPASTSURGICALHX_GEN_ALL_CORE
<-- Click to add NO significant Past Surgical History Cyclosporine Counseling:  I discussed with the patient the risks of cyclosporine including but not limited to hypertension, gingival hyperplasia,myelosuppression, immunosuppression, liver damage, kidney damage, neurotoxicity, lymphoma, and serious infections. The patient understands that monitoring is required including baseline blood pressure, CBC, CMP, lipid panel and uric acid, and then 1-2 times monthly CMP and blood pressure.

## 2023-05-24 NOTE — PATIENT PROFILE ADULT - TRANSPORTATION
Pt confirms signing consent for chemo plan.  Pt also confirms taking home doses of dexamethasone 8mg twice yesterday.  
Reviewed labs with , per MD give 5 units of regular insulin. Pt did take her dexamethasone today this morning as well as tradjenta, metformin and glipizide.      Lab Results   Component Value Date    GLUCOSE 517 (C) 05/24/2023    BUN 38 (H) 05/24/2023    CREATININE 1.25 (H) 05/24/2023    EGFR 43.1 (L) 05/24/2023    BCR 30.4 (H) 05/24/2023    K 4.0 05/24/2023    CO2 19.0 (L) 05/24/2023    CALCIUM 9.8 05/24/2023    ALBUMIN 4.1 05/24/2023    BILITOT 0.4 05/24/2023    AST 15 05/24/2023    ALT 14 05/24/2023     Glucose rechecked 1 hour after insulin given new level is 337, discussed with Deepthi HENRY no new orders at this time.   
no

## 2024-04-05 NOTE — H&P ADULT - NSHPPOAPULMEMBOLUS_GEN_A_CORE
no Of note pt anxious throughout assessment limiting pts functional abilities. Pt often stumbling towards the left throughout functional assessment. No significant strength deficits noted at this time.  Will continue to monitor patients functional abilities./contact guard